# Patient Record
Sex: FEMALE | Race: BLACK OR AFRICAN AMERICAN | NOT HISPANIC OR LATINO | Employment: FULL TIME | URBAN - METROPOLITAN AREA
[De-identification: names, ages, dates, MRNs, and addresses within clinical notes are randomized per-mention and may not be internally consistent; named-entity substitution may affect disease eponyms.]

---

## 2017-10-24 ENCOUNTER — CLINICAL SUPPORT (OUTPATIENT)
Dept: FAMILY MEDICINE | Facility: CLINIC | Age: 47
End: 2017-10-24
Payer: COMMERCIAL

## 2017-10-24 VITALS — HEART RATE: 80 BPM | SYSTOLIC BLOOD PRESSURE: 140 MMHG | DIASTOLIC BLOOD PRESSURE: 90 MMHG

## 2017-10-24 PROCEDURE — 99999 PR PBB SHADOW E&M-EST. PATIENT-LVL II: CPT | Mod: PBBFAC,,,

## 2017-10-24 NOTE — PROGRESS NOTES
.Rach Davalos 47 y.o. female is here today for Blood Pressure check.     History of HTN no.    Review of patient's allergies indicates:  No Known Allergies    No results found for: CREATININE, NA, K]    Patient denies taking blood pressure medications . Currently not on any blood pressure meds    Current Outpatient Prescriptions:     acetaminophen (TYLENOL) 325 MG tablet, Take 1 tablet (325 mg total) by mouth once daily., Disp: 30 tablet, Rfl: 0    Does patient have record of home blood pressure readings no. Have not been checking blood pressures at home. States that she will buy a cuff and start blood pressure at home. Verbalizes understanding of reading labels for sodium, monitoring sodium, and exercising. She admits to eating bad for the last 2 days - had ham and fried chicken     Patient is asymptomatic.     Complains of - no complaints    BP: (!) 140/90 , Pulse: 80 .    Blood pressure reading after 15 minutes was 142/92, Pulse 76    Dr Luciano will be notified - pt has scheduled an appt with him on 11/1/17

## 2017-11-01 ENCOUNTER — OFFICE VISIT (OUTPATIENT)
Dept: FAMILY MEDICINE | Facility: CLINIC | Age: 47
End: 2017-11-01
Payer: COMMERCIAL

## 2017-11-01 VITALS
WEIGHT: 285.06 LBS | HEIGHT: 64 IN | TEMPERATURE: 98 F | RESPIRATION RATE: 16 BRPM | DIASTOLIC BLOOD PRESSURE: 90 MMHG | BODY MASS INDEX: 48.67 KG/M2 | HEART RATE: 98 BPM | OXYGEN SATURATION: 98 % | SYSTOLIC BLOOD PRESSURE: 150 MMHG

## 2017-11-01 DIAGNOSIS — E66.01 MORBIDLY OBESE: Primary | ICD-10-CM

## 2017-11-01 DIAGNOSIS — R60.0 LOWER EXTREMITY EDEMA: ICD-10-CM

## 2017-11-01 DIAGNOSIS — Z00.00 ANNUAL PHYSICAL EXAM: ICD-10-CM

## 2017-11-01 DIAGNOSIS — R07.9 CHEST PAIN, UNSPECIFIED TYPE: ICD-10-CM

## 2017-11-01 PROCEDURE — 99999 PR PBB SHADOW E&M-EST. PATIENT-LVL IV: CPT | Mod: PBBFAC,,, | Performed by: FAMILY MEDICINE

## 2017-11-01 PROCEDURE — 99386 PREV VISIT NEW AGE 40-64: CPT | Mod: S$GLB,,, | Performed by: FAMILY MEDICINE

## 2017-11-01 NOTE — PROGRESS NOTES
Chief Complaint   Patient presents with    Annual Exam     would like an overall checkup       HPI  Rach Davalos is a 47 y.o. female with multiple medical diagnoses as listed in the medical history and problem list that presents for annual exam.      Annual exam     Obese - motivated to be preventive on health    Elevated BP - HA or blurry vision. Pt currently wears glasses.  States with activity, will feel mild chest pressure, although very rare. Decreased activity overall.     Pt is known to me and was last seen by me on Visit date not found.    PAST MEDICAL HISTORY:  History reviewed. No pertinent past medical history.    PAST SURGICAL HISTORY:  Past Surgical History:   Procedure Laterality Date     SECTION      4 kids. All c-sections       SOCIAL HISTORY:  Social History     Social History    Marital status:      Spouse name: N/A    Number of children: N/A    Years of education: N/A     Occupational History    Not on file.     Social History Main Topics    Smoking status: Never Smoker    Smokeless tobacco: Not on file    Alcohol use No    Drug use: No    Sexual activity: Not on file     Other Topics Concern    Not on file     Social History Narrative    No narrative on file       FAMILY HISTORY:  Family History   Problem Relation Age of Onset    Hypertension Mother     Heart attack Father        ALLERGIES AND MEDICATIONS: updated and reviewed.  Review of patient's allergies indicates:  No Known Allergies  Current Outpatient Prescriptions   Medication Sig Dispense Refill    acetaminophen (TYLENOL) 325 MG tablet Take 1 tablet (325 mg total) by mouth once daily. 30 tablet 0     No current facility-administered medications for this visit.        ROS  Review of Systems   Constitutional: Negative for activity change, appetite change and fever.   HENT: Negative for congestion and sore throat.    Eyes: Negative for visual disturbance.   Respiratory: Positive for chest tightness. Negative  "for cough and shortness of breath.    Cardiovascular: Negative for chest pain.   Gastrointestinal: Negative for abdominal pain, diarrhea, nausea and vomiting.   Endocrine: Negative.    Genitourinary: Negative for dysuria.   Musculoskeletal: Positive for joint swelling. Negative for arthralgias and back pain.   Skin: Negative for rash.   Allergic/Immunologic: Negative.    Neurological: Negative for dizziness, weakness and headaches.   Hematological: Negative.    Psychiatric/Behavioral: Negative for agitation and confusion.       Physical Exam  Vitals:    11/01/17 1633   BP: (!) 150/90   Pulse:    Resp:    Temp:     Body mass index is 48.93 kg/m².  Weight: 129.3 kg (285 lb 0.9 oz)   Height: 5' 4" (162.6 cm)     Physical Exam   Constitutional: She is oriented to person, place, and time. She appears well-developed and well-nourished.   HENT:   Head: Normocephalic and atraumatic.   Eyes: Conjunctivae and EOM are normal. Pupils are equal, round, and reactive to light.   Neck: Normal range of motion. Neck supple.   Cardiovascular: Normal rate, regular rhythm and normal heart sounds.    Pulmonary/Chest: Effort normal and breath sounds normal.   Abdominal: Soft. Bowel sounds are normal.   Musculoskeletal: Normal range of motion.   Neurological: She is alert and oriented to person, place, and time. She has normal reflexes.   Skin: Skin is warm and dry.   Psychiatric: She has a normal mood and affect. Her behavior is normal. Judgment and thought content normal.       Health Maintenance       Date Due Completion Date    Mammogram 04/29/2011 ---    Pap Smear with HPV Cotest 05/15/2017 5/15/2014    Influenza Vaccine 08/01/2017 ---    Lipid Panel 04/26/2019 4/26/2014 (Done)    Override on 4/26/2014: Done    TETANUS VACCINE 10/25/2019 10/25/2009 (Done)    Override on 10/25/2009: Done          Assessment & Plan    Morbidly obese    Annual physical exam  -     CBC auto differential; Future; Expected date: 11/01/2017  -     " Comprehensive metabolic panel; Future; Expected date: 11/01/2017  -     Lipid panel; Future; Expected date: 11/01/2017  -     T4, free; Future; Expected date: 11/01/2017  -     TSH; Future; Expected date: 11/01/2017  -     Mammo Digital Screening Bilat with CAD; Future; Expected date: 11/01/2017  - Counseled on age appropriate medical preventative services, including age appropriate cancer screenings, over all nutritional health, need for a consistent exercise regimen and an over all push towards maintaining a vigorous and active lifestyle.      - Counseled on age appropriate vaccines and discussed upcoming health care needs based on age/gender.  Spent time with patient counseling on need for a good patient/doctor relationship moving forward.  Discussed use of common OTC medications and supplements.  Discussed common dietary aids and use of caffeine and the need for good sleep hygiene and stress management.    - Pt refuses medications at this time  - Discussed lifestyle changes      Chest pain, unspecified type  -     Exercise stress echo; Future    Lower extremity edema  -     Exercise stress echo; Future        Return in about 4 weeks (around 11/29/2017).

## 2017-11-20 ENCOUNTER — HOSPITAL ENCOUNTER (OUTPATIENT)
Dept: RADIOLOGY | Facility: HOSPITAL | Age: 47
Discharge: HOME | End: 2017-11-20
Attending: FAMILY MEDICINE
Payer: COMMERCIAL

## 2017-11-20 VITALS — BODY MASS INDEX: 48.65 KG/M2 | WEIGHT: 285 LBS | HEIGHT: 64 IN

## 2017-11-20 DIAGNOSIS — Z12.31 VISIT FOR SCREENING MAMMOGRAM: ICD-10-CM

## 2017-11-20 PROCEDURE — 77067 SCR MAMMO BI INCL CAD: CPT | Mod: TC

## 2017-11-20 PROCEDURE — 77067 SCR MAMMO BI INCL CAD: CPT | Mod: 26,,, | Performed by: RADIOLOGY

## 2017-11-20 PROCEDURE — 77063 BREAST TOMOSYNTHESIS BI: CPT | Mod: 26,,, | Performed by: RADIOLOGY

## 2017-11-22 ENCOUNTER — CLINICAL SUPPORT (OUTPATIENT)
Dept: FAMILY MEDICINE | Facility: CLINIC | Age: 47
End: 2017-11-22
Payer: COMMERCIAL

## 2017-11-22 VITALS — DIASTOLIC BLOOD PRESSURE: 86 MMHG | SYSTOLIC BLOOD PRESSURE: 138 MMHG

## 2017-11-22 DIAGNOSIS — R03.0 ELEVATED BLOOD-PRESSURE READING WITHOUT DIAGNOSIS OF HYPERTENSION: Primary | ICD-10-CM

## 2017-11-22 NOTE — PROGRESS NOTES
Rach Davalos 47 y.o. female is here today for Blood Pressure check.   History of HTN no.    Review of patient's allergies indicates:  No Known Allergies  Creatinine   Date Value Ref Range Status   11/20/2017 0.8 0.5 - 1.4 mg/dL Final     Sodium   Date Value Ref Range Status   11/20/2017 139 136 - 145 mmol/L Final     Potassium   Date Value Ref Range Status   11/20/2017 4.0 3.5 - 5.1 mmol/L Final   ]  Patient denies taking blood pressure medications on a regular basis at the same time of the day.     Current Outpatient Prescriptions:     acetaminophen (TYLENOL) 325 MG tablet, Take 1 tablet (325 mg total) by mouth once daily., Disp: 30 tablet, Rfl: 0  Does patient have record of home blood pressure readings no. Readings have been averaging not done.   Last dose of blood pressure medication was taken at not on any medications.  Patient is asymptomatic.   Complains of no complaints.    Vitals:    11/22/17 1311   BP: 138/86   BP Location: Right arm   Patient Position: Sitting   BP Method: Large (Manual)         Dr. Luciano notified.

## 2017-11-29 ENCOUNTER — HOSPITAL ENCOUNTER (INPATIENT)
Facility: HOSPITAL | Age: 47
LOS: 2 days | Discharge: HOME | DRG: 281 | End: 2017-12-01
Attending: HOSPITALIST | Admitting: HOSPITALIST
Payer: COMMERCIAL

## 2017-11-29 ENCOUNTER — NURSE TRIAGE (OUTPATIENT)
Dept: ADMINISTRATIVE | Facility: CLINIC | Age: 47
End: 2017-11-29

## 2017-11-29 ENCOUNTER — HOSPITAL ENCOUNTER (EMERGENCY)
Facility: OTHER | Age: 47
Discharge: ANOTHER HOSPITAL | End: 2017-11-29
Attending: EMERGENCY MEDICINE
Payer: COMMERCIAL

## 2017-11-29 VITALS
DIASTOLIC BLOOD PRESSURE: 75 MMHG | SYSTOLIC BLOOD PRESSURE: 123 MMHG | RESPIRATION RATE: 18 BRPM | OXYGEN SATURATION: 97 % | BODY MASS INDEX: 47.72 KG/M2 | TEMPERATURE: 99 F | WEIGHT: 278 LBS | HEART RATE: 80 BPM

## 2017-11-29 DIAGNOSIS — E66.01 MORBIDLY OBESE: ICD-10-CM

## 2017-11-29 DIAGNOSIS — E88.810 METABOLIC SYNDROME: ICD-10-CM

## 2017-11-29 DIAGNOSIS — I21.4 NSTEMI (NON-ST ELEVATED MYOCARDIAL INFARCTION): Primary | ICD-10-CM

## 2017-11-29 DIAGNOSIS — R07.9 CHEST PAIN: ICD-10-CM

## 2017-11-29 LAB
B-HCG UR QL: NEGATIVE
BILIRUBIN, POC UA: NEGATIVE
BLOOD, POC UA: NEGATIVE
CLARITY, POC UA: CLEAR
COLOR, POC UA: YELLOW
CTP QC/QA: YES
GLUCOSE, POC UA: NEGATIVE
KETONES, POC UA: NEGATIVE
LEUKOCYTE EST, POC UA: NEGATIVE
NITRITE, POC UA: NEGATIVE
PH UR STRIP: 7.5 [PH]
POC B-TYPE NATRIURETIC PEPTIDE: 13.8 PG/ML (ref 0–100)
POC CARDIAC TROPONIN I: 0.22 NG/ML
POC PTINR: 1.1 (ref 0.9–1.2)
POC PTWBT: 13.3 SEC (ref 9.7–14.3)
PROTEIN, POC UA: NEGATIVE
SAMPLE: ABNORMAL
SAMPLE: NORMAL
SPECIFIC GRAVITY, POC UA: 1.02
UROBILINOGEN, POC UA: 0.2 E.U./DL

## 2017-11-29 PROCEDURE — 85651 RBC SED RATE NONAUTOMATED: CPT

## 2017-11-29 PROCEDURE — 96374 THER/PROPH/DIAG INJ IV PUSH: CPT

## 2017-11-29 PROCEDURE — 25000003 PHARM REV CODE 250: Performed by: HOSPITALIST

## 2017-11-29 PROCEDURE — 85025 COMPLETE CBC W/AUTO DIFF WBC: CPT

## 2017-11-29 PROCEDURE — 63600175 PHARM REV CODE 636 W HCPCS: Performed by: EMERGENCY MEDICINE

## 2017-11-29 PROCEDURE — 12000002 HC ACUTE/MED SURGE SEMI-PRIVATE ROOM

## 2017-11-29 PROCEDURE — 96372 THER/PROPH/DIAG INJ SC/IM: CPT

## 2017-11-29 PROCEDURE — 81003 URINALYSIS AUTO W/O SCOPE: CPT

## 2017-11-29 PROCEDURE — 25000003 PHARM REV CODE 250: Performed by: EMERGENCY MEDICINE

## 2017-11-29 PROCEDURE — 84484 ASSAY OF TROPONIN QUANT: CPT

## 2017-11-29 PROCEDURE — 86141 C-REACTIVE PROTEIN HS: CPT

## 2017-11-29 PROCEDURE — 80053 COMPREHEN METABOLIC PANEL: CPT

## 2017-11-29 PROCEDURE — 96375 TX/PRO/DX INJ NEW DRUG ADDON: CPT

## 2017-11-29 PROCEDURE — 99285 EMERGENCY DEPT VISIT HI MDM: CPT

## 2017-11-29 PROCEDURE — 81025 URINE PREGNANCY TEST: CPT | Performed by: EMERGENCY MEDICINE

## 2017-11-29 PROCEDURE — 84100 ASSAY OF PHOSPHORUS: CPT

## 2017-11-29 PROCEDURE — 83880 ASSAY OF NATRIURETIC PEPTIDE: CPT

## 2017-11-29 PROCEDURE — 36415 COLL VENOUS BLD VENIPUNCTURE: CPT

## 2017-11-29 PROCEDURE — 85610 PROTHROMBIN TIME: CPT

## 2017-11-29 RX ORDER — BISACODYL 10 MG
10 SUPPOSITORY, RECTAL RECTAL DAILY PRN
Status: DISCONTINUED | OUTPATIENT
Start: 2017-11-30 | End: 2017-12-01 | Stop reason: HOSPADM

## 2017-11-29 RX ORDER — PROMETHAZINE HYDROCHLORIDE 25 MG/1
25 SUPPOSITORY RECTAL EVERY 6 HOURS PRN
Status: DISCONTINUED | OUTPATIENT
Start: 2017-11-30 | End: 2017-12-01 | Stop reason: HOSPADM

## 2017-11-29 RX ORDER — NITROGLYCERIN 0.4 MG/1
0.4 TABLET SUBLINGUAL EVERY 5 MIN PRN
Status: DISCONTINUED | OUTPATIENT
Start: 2017-11-30 | End: 2017-12-01 | Stop reason: HOSPADM

## 2017-11-29 RX ORDER — MORPHINE SULFATE 5 MG/ML
4 INJECTION, SOLUTION INTRAMUSCULAR; INTRAVENOUS EVERY 4 HOURS PRN
Status: DISCONTINUED | OUTPATIENT
Start: 2017-11-30 | End: 2017-12-01 | Stop reason: HOSPADM

## 2017-11-29 RX ORDER — ASPIRIN 325 MG
325 TABLET ORAL DAILY
Status: DISCONTINUED | OUTPATIENT
Start: 2017-11-30 | End: 2017-11-30

## 2017-11-29 RX ORDER — MORPHINE SULFATE 5 MG/ML
2 INJECTION, SOLUTION INTRAMUSCULAR; INTRAVENOUS EVERY 4 HOURS PRN
Status: DISCONTINUED | OUTPATIENT
Start: 2017-11-30 | End: 2017-12-01 | Stop reason: HOSPADM

## 2017-11-29 RX ORDER — SODIUM CHLORIDE 0.9 % (FLUSH) 0.9 %
3 SYRINGE (ML) INJECTION EVERY 8 HOURS
Status: DISCONTINUED | OUTPATIENT
Start: 2017-11-30 | End: 2017-12-01 | Stop reason: HOSPADM

## 2017-11-29 RX ORDER — NITROGLYCERIN 0.4 MG/1
0.4 TABLET SUBLINGUAL EVERY 5 MIN PRN
Status: DISCONTINUED | OUTPATIENT
Start: 2017-11-29 | End: 2017-11-29 | Stop reason: HOSPADM

## 2017-11-29 RX ORDER — AMOXICILLIN 250 MG
1 CAPSULE ORAL 2 TIMES DAILY
Status: DISCONTINUED | OUTPATIENT
Start: 2017-11-29 | End: 2017-12-01 | Stop reason: HOSPADM

## 2017-11-29 RX ORDER — HYDRALAZINE HYDROCHLORIDE 20 MG/ML
10 INJECTION INTRAMUSCULAR; INTRAVENOUS EVERY 6 HOURS PRN
Status: DISCONTINUED | OUTPATIENT
Start: 2017-11-30 | End: 2017-12-01 | Stop reason: HOSPADM

## 2017-11-29 RX ORDER — ACETAMINOPHEN 325 MG/1
650 TABLET ORAL EVERY 6 HOURS PRN
Status: DISCONTINUED | OUTPATIENT
Start: 2017-11-30 | End: 2017-12-01 | Stop reason: HOSPADM

## 2017-11-29 RX ORDER — RAMELTEON 8 MG/1
8 TABLET ORAL NIGHTLY PRN
Status: DISCONTINUED | OUTPATIENT
Start: 2017-11-30 | End: 2017-12-01 | Stop reason: HOSPADM

## 2017-11-29 RX ORDER — DEXTROMETHORPHAN POLISTIREX 30 MG/5 ML
1 SUSPENSION, EXTENDED RELEASE 12 HR ORAL DAILY PRN
Status: DISCONTINUED | OUTPATIENT
Start: 2017-11-30 | End: 2017-12-01 | Stop reason: HOSPADM

## 2017-11-29 RX ORDER — POLYETHYLENE GLYCOL 3350 17 G/17G
17 POWDER, FOR SOLUTION ORAL DAILY
Status: DISCONTINUED | OUTPATIENT
Start: 2017-11-30 | End: 2017-12-01 | Stop reason: HOSPADM

## 2017-11-29 RX ORDER — ONDANSETRON 2 MG/ML
8 INJECTION INTRAMUSCULAR; INTRAVENOUS EVERY 8 HOURS PRN
Status: DISCONTINUED | OUTPATIENT
Start: 2017-11-30 | End: 2017-12-01 | Stop reason: HOSPADM

## 2017-11-29 RX ORDER — ENOXAPARIN SODIUM 100 MG/ML
1 INJECTION SUBCUTANEOUS
Status: COMPLETED | OUTPATIENT
Start: 2017-11-29 | End: 2017-11-29

## 2017-11-29 RX ORDER — MORPHINE SULFATE 10 MG/ML
4 INJECTION INTRAMUSCULAR; INTRAVENOUS; SUBCUTANEOUS
Status: COMPLETED | OUTPATIENT
Start: 2017-11-29 | End: 2017-11-29

## 2017-11-29 RX ORDER — ENOXAPARIN SODIUM 100 MG/ML
40 INJECTION SUBCUTANEOUS EVERY 24 HOURS
Status: DISCONTINUED | OUTPATIENT
Start: 2017-11-30 | End: 2017-11-30 | Stop reason: DRUGHIGH

## 2017-11-29 RX ORDER — ATORVASTATIN CALCIUM 40 MG/1
80 TABLET, FILM COATED ORAL DAILY
Status: DISCONTINUED | OUTPATIENT
Start: 2017-11-30 | End: 2017-12-01 | Stop reason: HOSPADM

## 2017-11-29 RX ORDER — ASPIRIN 325 MG
325 TABLET ORAL
Status: COMPLETED | OUTPATIENT
Start: 2017-11-29 | End: 2017-11-29

## 2017-11-29 RX ORDER — METOPROLOL TARTRATE 50 MG/1
50 TABLET ORAL 2 TIMES DAILY
Status: DISCONTINUED | OUTPATIENT
Start: 2017-11-29 | End: 2017-12-01 | Stop reason: HOSPADM

## 2017-11-29 RX ORDER — METOPROLOL TARTRATE 1 MG/ML
5 INJECTION, SOLUTION INTRAVENOUS EVERY 5 MIN PRN
Status: DISCONTINUED | OUTPATIENT
Start: 2017-11-30 | End: 2017-12-01 | Stop reason: HOSPADM

## 2017-11-29 RX ORDER — ONDANSETRON 2 MG/ML
4 INJECTION INTRAMUSCULAR; INTRAVENOUS
Status: COMPLETED | OUTPATIENT
Start: 2017-11-29 | End: 2017-11-29

## 2017-11-29 RX ADMIN — ASPIRIN 325 MG ORAL TABLET 325 MG: 325 PILL ORAL at 05:11

## 2017-11-29 RX ADMIN — ENOXAPARIN SODIUM 100 MG: 100 INJECTION SUBCUTANEOUS at 05:11

## 2017-11-29 RX ADMIN — NITROGLYCERIN 1 INCH: 20 OINTMENT TOPICAL at 06:11

## 2017-11-29 RX ADMIN — ONDANSETRON 4 MG: 2 INJECTION INTRAMUSCULAR; INTRAVENOUS at 06:11

## 2017-11-29 RX ADMIN — NITROGLYCERIN 0.4 MG: 0.4 TABLET SUBLINGUAL at 05:11

## 2017-11-29 RX ADMIN — METOPROLOL TARTRATE 50 MG: 50 TABLET ORAL at 11:11

## 2017-11-29 RX ADMIN — MORPHINE SULFATE 4 MG: 10 INJECTION INTRAVENOUS at 06:11

## 2017-11-29 NOTE — TELEPHONE ENCOUNTER
Pt reports chest pain since this morning. Rates pain 7.5/10. Pain goes into her arm. Pain is present now.    Pt refusing ER- wants to see PCP- please contact her to advise    Reason for Disposition   Pain also present in shoulder(s) or arm(s) or jaw    Protocols used: ST CHEST PAIN-A-[place]

## 2017-11-29 NOTE — TELEPHONE ENCOUNTER
Reason for Disposition   Nursing judgment     Patient is advised as per triage earlier. Patient wants doctors appointment.No appointment with patient PCP  Is available.  Patient requesting info to . Information given.    Protocols used: ST NO PROTOCOL CALL: SICK ADULT-A-[place]

## 2017-11-29 NOTE — ED PROVIDER NOTES
Encounter Date: 2017       History     Chief Complaint   Patient presents with    Chest Pain     patient reports having chest pains/tightness which started earlier today.  patient denies radiation     Rach Davalos is a 47 y.o. female who presents to the Emergency Department with  chest pain started at 8 AM today patient reports it as a tightness feels heavy.  Patient states she was in a pre-observation meeting felt she might of been a little stressed was hoping the pain was the end after the meeting it did not.  Patient reports a heaviness got worse around 11 if today.  She tried to belch it didn't help she ate lunch at 11:30 did not improve her pain.  Patient states at the worst the pain was a 9 out of 10 on arrival to the ED the pain as a 7 out of 10.      The history is provided by the patient.   Chest Pain   The current episode started today. Chest pain occurs constantly. The chest pain is improving. At its most intense, the chest pain is at 9/10. The chest pain is currently at 7/10. The quality of the pain is described as aching, heavy, pressure-like and tightness. The pain does not radiate. Pertinent negatives for primary symptoms include no fever, no shortness of breath, no wheezing, no palpitations and no nausea.   Pertinent negatives for associated symptoms include no weakness. She tried nothing for the symptoms. There are no known risk factors.   Pertinent negatives for past medical history include no CAD and no diabetes.   Her family medical history is significant for CAD.     Review of patient's allergies indicates:  No Known Allergies  No past medical history on file.  Past Surgical History:   Procedure Laterality Date     SECTION      4 kids. All c-sections     Family History   Problem Relation Age of Onset    Hypertension Mother     Heart attack Father      Social History   Substance Use Topics    Smoking status: Never Smoker    Smokeless tobacco: Never Used    Alcohol use No      Review of Systems   Constitutional: Negative for fever.   HENT: Negative for sore throat.    Respiratory: Negative for shortness of breath and wheezing.    Cardiovascular: Positive for chest pain. Negative for palpitations.   Gastrointestinal: Negative for nausea.   Genitourinary: Negative for dysuria.   Musculoskeletal: Negative for back pain.   Skin: Negative for rash.   Neurological: Negative for weakness.   Hematological: Does not bruise/bleed easily.   All other systems reviewed and are negative.      Physical Exam     Initial Vitals   BP Pulse Resp Temp SpO2   11/29/17 1618 11/29/17 1608 11/29/17 1608 11/29/17 1608 11/29/17 1608   (!) 180/98 80 18 97.8 °F (36.6 °C) 100 %      MAP       11/29/17 1618       125.33         Physical Exam    Nursing note and vitals reviewed.  Constitutional: She appears well-developed and well-nourished. She is Obese . She appears distressed.   HENT:   Head: Normocephalic and atraumatic.   Right Ear: External ear normal.   Left Ear: External ear normal.   Eyes: Conjunctivae and EOM are normal. Pupils are equal, round, and reactive to light. Right eye exhibits no discharge. Left eye exhibits no discharge.   Neck: Normal range of motion. Neck supple. JVD present.   Cardiovascular: Normal rate, regular rhythm, normal heart sounds and intact distal pulses. Exam reveals no gallop and no friction rub.    No murmur heard.  Pulmonary/Chest: Breath sounds normal. No respiratory distress. She has no wheezes. She has no rhonchi. She has no rales. She exhibits no tenderness.   Abdominal: Soft. Bowel sounds are normal. She exhibits no distension and no mass. There is no tenderness. There is no rebound and no guarding.   Musculoskeletal: Normal range of motion. She exhibits no edema or tenderness.   Lymphadenopathy:     She has no cervical adenopathy.   Neurological: She is alert and oriented to person, place, and time. She has normal strength and normal reflexes. She displays normal  reflexes. No cranial nerve deficit or sensory deficit.   Skin: Skin is warm and dry. Capillary refill takes less than 2 seconds. No rash noted. No pallor.   Psychiatric: She has a normal mood and affect.         ED Course   Critical Care  Date/Time: 11/29/2017 7:23 PM  Performed by: MEDINA RYAN  Authorized by: MEDINA RYAN   Direct patient critical care time: 15 minutes  Additional history critical care time: 10 minutes  Ordering / reviewing critical care time: 15 minutes  Documentation critical care time: 12 minutes  Consulting other physicians critical care time: 10 minutes  Consult with family critical care time: 10 minutes  Total critical care time (exclusive of procedural time) : 72 minutes  Critical care was necessary to treat or prevent imminent or life-threatening deterioration of the following conditions: cardiac failure.  Critical care was time spent personally by me on the following activities: discussions with consultants, interpretation of cardiac output measurements, evaluation of patient's response to treatment, examination of patient, obtaining history from patient or surrogate, ordering and performing treatments and interventions, ordering and review of laboratory studies, ordering and review of radiographic studies, pulse oximetry, re-evaluation of patient's condition and review of old charts.  Comments: Patient given aspirin on arrival and placed on oxygen.  EKG no ST elevation appreciated.  Patient given nitroglycerin with significant relief of pain.  After nitroglycerin pain was 3/10 from 9/10.  Patient given morphine 4 mg IV with complete relief of all pain.  Nitroglycerin placed pain*chest wall.  Patient admitted to [facility] for NSTEMI        Labs Reviewed   TROPONIN ISTAT - Abnormal; Notable for the following:        Result Value    POC Cardiac Troponin I 0.22 (*)     All other components within normal limits   POCT URINALYSIS W/O SCOPE - Abnormal; Notable for the following:      Glucose, UA Negative (*)     Bilirubin, UA Negative (*)     Ketones, UA Negative (*)     Blood, UA Negative (*)     Protein, UA Negative (*)     Nitrite, UA Negative (*)     Leukocytes, UA Negative (*)     All other components within normal limits   POCT URINALYSIS W/O SCOPE   POCT URINE PREGNANCY   POCT CBC   POCT CMP   POCT PROTIME-INR   POCT TROPONIN   POCT B-TYPE NATRIURETIC PEPTIDE (BNP)   ISTAT PROCEDURE   POCT B-TYPE NATRIURETIC PEPTIDE (BNP)       Troponin is elevated at 0.22.    BNP is 13.8  CBC White blood cell count 8.5, hemoglobin 12.5, hematocrit 39.6, and platelet is 246  CMP sodium 143, potassium 5.3, bicarbonate 27, chloride 104, glucose 96, be UN 11, and creatinine 0.7  INR is 1.1  UA negative leukocytes, negative nitrates, negative ketones, negative glucose    EKG Readings: (Independently Interpreted)   Initial Reading: No STEMI. Rhythm: Normal Sinus Rhythm. Heart Rate: 78. Axis: Normal. Other Impression: Normal sinus rhythm, no ST elevation, LVH by criteria, QTC normal at 440.   Other EKG Interpretations: Repeat EKG done at 1715  Ventricular rate of 91, normal sinus rhythm  On repeat EKG half a box of ST elevation in lead 1 and V2.  No ST depression reciprocal changes appreciated.  LVH via criteria.     Imaging Results          X-Ray Chest PA And Lateral (Final result)  Result time 11/29/17 17:27:47    Final result by Los Arce MD (11/29/17 17:27:47)                 Impression:        No radiographic acute intrathoracic process seen.      Electronically signed by: LOS ARCE MD, MD  Date:     11/29/17  Time:    17:27              Narrative:    COMPARISON: None    FINDINGS: PA and lateral views of the chest. Large body habitus.   Pulmonary vasculature and hilar regions are within normal limits.  The bilateral lungs are well expanded and clear.  No pleural effusion or pneumothorax.  The heart and mediastinal contours are within normal limits for age.  Included osseous structures appear  intact.                                                   ED Course          Medical decision making   Chief complaint: Chest pain that started at 8:30 AM  Differential diagnosis: STEMI, in STEMI, pneumothorax, bronchitis, pneumonia, and anxiety  Treatment in the ED Physical Exam, aspirin, nitroglycerin, oxygen, and morphine  Patient reports significantly decreased pain after medications given in the ED.  Patient states all pressure and tightness in her chest has resolved.    Discussed labs, and imaging results.    Discussed with patient and her family need for admission.  Both are agreeable to admission and transfer to [facility].  Contacted [facility] and  spoke with Debby at 17:15. Consultation with DR Fowler for admission.  Discussed patient's presentation, past medical history, physical exam, and ED course.  Also discussed vital signs and diagnosis is.  At this time patient will be transferred & admitted.     At time of transfer patient was awake alert resting comfortably in no distress moving all 4 extremities.  Clinical Impression:   The encounter diagnosis was NSTEMI (non-ST elevated myocardial infarction).                           Lexi Meza, DO  11/29/17 1845       Lexi Meza, DO  11/29/17 1922

## 2017-11-30 LAB
ALBUMIN SERPL BCP-MCNC: 2.9 G/DL
ALBUMIN SERPL-MCNC: 3.3 G/DL (ref 3.3–5.5)
ALP SERPL-CCNC: 57 U/L (ref 42–141)
ALP SERPL-CCNC: 66 U/L
ALT SERPL W/O P-5'-P-CCNC: 19 U/L
ANION GAP SERPL CALC-SCNC: 5 MMOL/L
AST SERPL-CCNC: 35 U/L
BILIRUB SERPL-MCNC: 0.4 MG/DL
BILIRUB SERPL-MCNC: 0.4 MG/DL (ref 0.2–1.6)
BUN SERPL-MCNC: 11 MG/DL (ref 7–22)
BUN SERPL-MCNC: 9 MG/DL
CALCIUM SERPL-MCNC: 9 MG/DL
CALCIUM SERPL-MCNC: 9.2 MG/DL (ref 8–10.3)
CHLORIDE SERPL-SCNC: 104 MMOL/L
CHLORIDE SERPL-SCNC: 104 MMOL/L (ref 98–108)
CHOLEST SERPL-MCNC: 210 MG/DL
CHOLEST/HDLC SERPL: 3.5 {RATIO}
CO2 SERPL-SCNC: 27 MMOL/L
CORONARY STENOSIS: ABNORMAL
CREAT SERPL-MCNC: 0.7 MG/DL (ref 0.6–1.2)
CREAT SERPL-MCNC: 0.8 MG/DL
CRP SERPL-MCNC: 14.23 MG/L
ERYTHROCYTE [SEDIMENTATION RATE] IN BLOOD BY WESTERGREN METHOD: 45 MM/HR
EST. GFR  (AFRICAN AMERICAN): >60 ML/MIN/1.73 M^2
EST. GFR  (NON AFRICAN AMERICAN): >60 ML/MIN/1.73 M^2
ESTIMATED PA SYSTOLIC PRESSURE: 31.94
GLOBAL PERICARDIAL EFFUSION: NORMAL
GLUCOSE SERPL-MCNC: 127 MG/DL
GLUCOSE SERPL-MCNC: 96 MG/DL (ref 73–118)
HDLC SERPL-MCNC: 60 MG/DL
HDLC SERPL: 28.6 %
LDLC SERPL CALC-MCNC: 134.4 MG/DL
MAGNESIUM SERPL-MCNC: 2 MG/DL
MITRAL VALVE MOBILITY: NORMAL
MITRAL VALVE REGURGITATION: NORMAL
NONHDLC SERPL-MCNC: 150 MG/DL
PHOSPHATE SERPL-MCNC: 3.1 MG/DL
POC ALT (SGPT): 22 U/L (ref 10–47)
POC AST (SGOT): 34 U/L (ref 11–38)
POC TCO2: 27 MMOL/L (ref 18–33)
POTASSIUM BLD-SCNC: 5.3 MMOL/L (ref 3.6–5.1)
POTASSIUM SERPL-SCNC: 4.5 MMOL/L
PROT SERPL-MCNC: 7.6 G/DL
PROTEIN, POC: 7.8 G/DL (ref 6.4–8.1)
RETIRED EF AND QEF - SEE NOTES: 55 (ref 55–65)
SODIUM BLD-SCNC: 143 MMOL/L (ref 128–145)
SODIUM SERPL-SCNC: 136 MMOL/L
TRICUSPID VALVE REGURGITATION: NORMAL
TRIGL SERPL-MCNC: 78 MG/DL
TROPONIN I SERPL DL<=0.01 NG/ML-MCNC: 2.01 NG/ML
TROPONIN I SERPL DL<=0.01 NG/ML-MCNC: 3.6 NG/ML
TROPONIN I SERPL DL<=0.01 NG/ML-MCNC: 3.61 NG/ML

## 2017-11-30 PROCEDURE — 25000003 PHARM REV CODE 250

## 2017-11-30 PROCEDURE — 80061 LIPID PANEL: CPT

## 2017-11-30 PROCEDURE — 93306 TTE W/DOPPLER COMPLETE: CPT | Mod: 26,,, | Performed by: INTERNAL MEDICINE

## 2017-11-30 PROCEDURE — 93458 L HRT ARTERY/VENTRICLE ANGIO: CPT | Mod: 26,,, | Performed by: INTERNAL MEDICINE

## 2017-11-30 PROCEDURE — 36415 COLL VENOUS BLD VENIPUNCTURE: CPT

## 2017-11-30 PROCEDURE — C1769 GUIDE WIRE: HCPCS

## 2017-11-30 PROCEDURE — 94761 N-INVAS EAR/PLS OXIMETRY MLT: CPT

## 2017-11-30 PROCEDURE — A4216 STERILE WATER/SALINE, 10 ML: HCPCS

## 2017-11-30 PROCEDURE — 25000003 PHARM REV CODE 250: Performed by: HOSPITALIST

## 2017-11-30 PROCEDURE — A4216 STERILE WATER/SALINE, 10 ML: HCPCS | Performed by: HOSPITALIST

## 2017-11-30 PROCEDURE — 80053 COMPREHEN METABOLIC PANEL: CPT

## 2017-11-30 PROCEDURE — 25000003 PHARM REV CODE 250: Performed by: INTERNAL MEDICINE

## 2017-11-30 PROCEDURE — 93306 TTE W/DOPPLER COMPLETE: CPT

## 2017-11-30 PROCEDURE — 83735 ASSAY OF MAGNESIUM: CPT

## 2017-11-30 PROCEDURE — 27000221 HC OXYGEN, UP TO 24 HOURS

## 2017-11-30 PROCEDURE — 4A023N7 MEASUREMENT OF CARDIAC SAMPLING AND PRESSURE, LEFT HEART, PERCUTANEOUS APPROACH: ICD-10-PCS | Performed by: INTERNAL MEDICINE

## 2017-11-30 PROCEDURE — 99152 MOD SED SAME PHYS/QHP 5/>YRS: CPT

## 2017-11-30 PROCEDURE — 12000002 HC ACUTE/MED SURGE SEMI-PRIVATE ROOM

## 2017-11-30 PROCEDURE — 99255 IP/OBS CONSLTJ NEW/EST HI 80: CPT | Mod: ,,, | Performed by: INTERNAL MEDICINE

## 2017-11-30 PROCEDURE — 84484 ASSAY OF TROPONIN QUANT: CPT | Mod: 91

## 2017-11-30 PROCEDURE — B2111ZZ FLUOROSCOPY OF MULTIPLE CORONARY ARTERIES USING LOW OSMOLAR CONTRAST: ICD-10-PCS | Performed by: INTERNAL MEDICINE

## 2017-11-30 PROCEDURE — 63600175 PHARM REV CODE 636 W HCPCS

## 2017-11-30 PROCEDURE — 99152 MOD SED SAME PHYS/QHP 5/>YRS: CPT | Mod: ,,, | Performed by: INTERNAL MEDICINE

## 2017-11-30 RX ORDER — ASPIRIN 81 MG/1
81 TABLET ORAL DAILY
Status: DISCONTINUED | OUTPATIENT
Start: 2017-11-30 | End: 2017-12-01 | Stop reason: HOSPADM

## 2017-11-30 RX ORDER — ENOXAPARIN SODIUM 100 MG/ML
1 INJECTION SUBCUTANEOUS
Status: DISCONTINUED | OUTPATIENT
Start: 2017-11-30 | End: 2017-11-30

## 2017-11-30 RX ORDER — HYDROCODONE BITARTRATE AND ACETAMINOPHEN 5; 325 MG/1; MG/1
1 TABLET ORAL EVERY 4 HOURS PRN
Status: DISCONTINUED | OUTPATIENT
Start: 2017-11-30 | End: 2017-12-01 | Stop reason: HOSPADM

## 2017-11-30 RX ORDER — SODIUM CHLORIDE 9 MG/ML
5 INJECTION, SOLUTION INTRAVENOUS CONTINUOUS
Status: ACTIVE | OUTPATIENT
Start: 2017-11-30 | End: 2017-11-30

## 2017-11-30 RX ORDER — CLOPIDOGREL BISULFATE 75 MG/1
75 TABLET ORAL DAILY
Status: DISCONTINUED | OUTPATIENT
Start: 2017-12-01 | End: 2017-12-01 | Stop reason: HOSPADM

## 2017-11-30 RX ORDER — ENOXAPARIN SODIUM 100 MG/ML
80 INJECTION SUBCUTANEOUS
Status: DISCONTINUED | OUTPATIENT
Start: 2017-11-30 | End: 2017-11-30 | Stop reason: DRUGHIGH

## 2017-11-30 RX ORDER — CLOPIDOGREL 300 MG/1
600 TABLET, FILM COATED ORAL ONCE
Status: COMPLETED | OUTPATIENT
Start: 2017-11-30 | End: 2017-11-30

## 2017-11-30 RX ORDER — ATROPINE SULFATE 0.1 MG/ML
0.5 INJECTION INTRAVENOUS
Status: DISCONTINUED | OUTPATIENT
Start: 2017-11-30 | End: 2017-12-01 | Stop reason: HOSPADM

## 2017-11-30 RX ORDER — SODIUM CHLORIDE 9 MG/ML
INJECTION, SOLUTION INTRAVENOUS CONTINUOUS
Status: ACTIVE | OUTPATIENT
Start: 2017-11-30 | End: 2017-11-30

## 2017-11-30 RX ADMIN — SODIUM CHLORIDE: 0.9 INJECTION, SOLUTION INTRAVENOUS at 11:11

## 2017-11-30 RX ADMIN — METOPROLOL TARTRATE 50 MG: 50 TABLET ORAL at 09:11

## 2017-11-30 RX ADMIN — METOPROLOL TARTRATE 50 MG: 50 TABLET ORAL at 11:11

## 2017-11-30 RX ADMIN — ASPIRIN 81 MG: 81 TABLET, COATED ORAL at 11:11

## 2017-11-30 RX ADMIN — CLOPIDOGREL BISULFATE 600 MG: 300 TABLET, FILM COATED ORAL at 11:11

## 2017-11-30 RX ADMIN — ATORVASTATIN CALCIUM 80 MG: 40 TABLET, FILM COATED ORAL at 11:11

## 2017-11-30 RX ADMIN — SODIUM CHLORIDE 5 ML/KG/HR: 0.9 INJECTION, SOLUTION INTRAVENOUS at 04:11

## 2017-11-30 RX ADMIN — SODIUM CHLORIDE, PRESERVATIVE FREE 3 ML: 5 INJECTION INTRAVENOUS at 10:11

## 2017-11-30 RX ADMIN — SODIUM CHLORIDE, PRESERVATIVE FREE 3 ML: 5 INJECTION INTRAVENOUS at 05:11

## 2017-11-30 RX ADMIN — DOCUSATE SODIUM AND SENNOSIDES 1 TABLET: 8.6; 5 TABLET, FILM COATED ORAL at 09:11

## 2017-11-30 NOTE — SUBJECTIVE & OBJECTIVE
Interval History: no acute events    Review of Systems   Constitutional: Negative for chills and fever.   HENT: Negative for congestion and rhinorrhea.    Eyes: Negative for photophobia and visual disturbance.   Respiratory: Negative for cough and shortness of breath.    Cardiovascular: Positive for chest pain. Negative for leg swelling.   Gastrointestinal: Negative for abdominal pain, nausea and vomiting.   Genitourinary: Negative for difficulty urinating and dysuria.   Musculoskeletal: Negative for joint swelling and neck stiffness.   Skin: Negative for rash and wound.   Neurological: Negative for dizziness and light-headedness.   Psychiatric/Behavioral: Negative for agitation and behavioral problems.     Objective:     Vital Signs (Most Recent):  Temp: 98.1 °F (36.7 °C) (11/30/17 1528)  Pulse: 86 (11/30/17 1658)  Resp: 18 (11/30/17 1528)  BP: (!) 155/87 (11/30/17 1658)  SpO2: 96 % (11/30/17 1658) Vital Signs (24h Range):  Temp:  [96 °F (35.6 °C)-98.5 °F (36.9 °C)] 98.1 °F (36.7 °C)  Pulse:  [69-87] 86  Resp:  [18-20] 18  SpO2:  [93 %-100 %] 96 %  BP: (120-165)/(68-99) 155/87     Weight: 129.7 kg (285 lb 15 oz)  Body mass index is 44.78 kg/m².    Intake/Output Summary (Last 24 hours) at 11/30/17 1731  Last data filed at 11/29/17 2130   Gross per 24 hour   Intake              120 ml   Output                0 ml   Net              120 ml      Physical Exam   Constitutional: She is oriented to person, place, and time. She appears well-developed and well-nourished. No distress.   HENT:   Right Ear: External ear normal.   Left Ear: External ear normal.   Nose: Nose normal.   Eyes: EOM are normal. Pupils are equal, round, and reactive to light. No scleral icterus.   Neck: Normal range of motion. Neck supple. No thyromegaly present.   Cardiovascular: Normal rate and normal heart sounds.  Exam reveals no friction rub.    No murmur heard.  Pulmonary/Chest: Effort normal and breath sounds normal. No respiratory distress.  She has no wheezes. She has no rales.   Abdominal: Soft. Bowel sounds are normal. She exhibits no mass. There is no tenderness.   Morbidly obese   Musculoskeletal: Normal range of motion. She exhibits no edema or deformity.   Neurological: She is alert and oriented to person, place, and time. No cranial nerve deficit.   Skin: Skin is warm and dry. No pallor.   Psychiatric: She has a normal mood and affect. Her behavior is normal. Thought content normal.       Significant Labs: All pertinent labs within the past 24 hours have been reviewed.    Significant Imaging: I have reviewed and interpreted all pertinent imaging results/findings within the past 24 hours.

## 2017-11-30 NOTE — PLAN OF CARE
Discharge Planning Assessment completed.  Patient from home with spouse and independent.  No DME.  Prefers a.m. appts. and preferred pharmacy is [facility] [address]       11/30/17 1201   Discharge Assessment   Assessment Type Discharge Planning Assessment   Confirmed/corrected address and phone number on facesheet? Yes   Assessment information obtained from? Patient   Communicated expected length of stay with patient/caregiver yes   Prior to hospitilization cognitive status: Alert/Oriented   Prior to hospitalization functional status: Independent   Current cognitive status: Alert/Oriented   Current Functional Status: Independent   Facility Arrived From: Home   Lives With spouse   Able to Return to Prior Arrangements yes   Is patient able to care for self after discharge? Yes   Who are your caregiver(s) and their phone number(s)? Independent.  Spouse will be able to assist if needed.  Vince Davalos:  [phone]   Patient's perception of discharge disposition home or selfcare   Readmission Within The Last 30 Days no previous admission in last 30 days   Patient currently being followed by outpatient case management? No   Patient currently receives any other outside agency services? No   Equipment Currently Used at Home none   Do you have any problems affording any of your prescribed medications? No   Is the patient taking medications as prescribed? yes   Does the patient have transportation home? Yes   Transportation Available family or friend will provide   Does the patient receive services at the [facility]? No   Discharge Plan A Home   Discharge Plan B Home   Patient/Family In Agreement With Plan yes   Fani Jordan LMSW, LETTY-[place], CCM  11/30/2017

## 2017-11-30 NOTE — CONSULTS
[facility]  Cardiology  Consult Note    Patient Name: Rach Davalos  MRN: [id number]  Admission Date: 2017  Hospital Length of Stay: 1 days  Code Status: Full Code   Attending Provider: My Pimentel MD   Consulting Provider: Bandar Broderick MD  Primary Care Physician: Hang Luciano MD  Principal Problem:NSTEMI (non-ST elevated myocardial infarction)    Patient information was obtained from patient and ER records.     Inpatient consult to Cardiology  Consult performed by: BANDAR BRODERICK  Consult ordered by: BANDAR REEVES  Reason for consult: NSTEMI        Subjective:     Chief Complaint:  CP     HPI:   47 y.o. female who presents to the Emergency Department with  chest pain started at 8 AM today patient reports it as a tightness feels heavy.  Patient states she was in a pre-observation meeting felt she might of been a little stressed was hoping the pain was the end after the meeting it did not.  Patient reports a heaviness got worse around 11 if today.  She tried to belch it didn't help she ate lunch at 11:30 did not improve her pain.  Patient states at the worst the pain was a 9 out of 10 on arrival to the ED the pain as a 7 out of 10.    Above hx confirmed.  Pt currently pain free.  Echo normal.    Risks, benefits and alternatives of the catheterization procedure were discussed with the patient and family at [facility], which include but are not limited to: bleeding, infection, death, heart attack, arrhythmia, kidney failure, stroke, need for emergency surgery, etc.  Patient understands and and agrees to proceed.  Consent was placed on the chart.      History reviewed. No pertinent past medical history.    Past Surgical History:   Procedure Laterality Date     SECTION      4 kids. All c-sections       Review of patient's allergies indicates:  No Known Allergies    Current Facility-Administered Medications on File Prior to Encounter   Medication    [COMPLETED]  aspirin tablet 325 mg    [COMPLETED] enoxaparin injection 130 mg    [COMPLETED] morphine injection 4 mg    [COMPLETED] nitroGLYCERIN 2% TD oint ointment 1 inch    [COMPLETED] ondansetron injection 4 mg    [DISCONTINUED] nitroGLYCERIN SL tablet 0.4 mg     Current Outpatient Prescriptions on File Prior to Encounter   Medication Sig    acetaminophen (TYLENOL) 325 MG tablet Take 1 tablet (325 mg total) by mouth once daily.     Family History     Problem Relation (Age of Onset)    Heart attack Father    Hypertension Mother        Social History Main Topics    Smoking status: Never Smoker    Smokeless tobacco: Never Used    Alcohol use No    Drug use: No    Sexual activity: Yes     Partners: Male     Birth control/ protection: None     Review of Systems   Constitution: Negative for chills, diaphoresis, fever, weakness and malaise/fatigue.   HENT: Negative for nosebleeds.    Eyes: Negative for blurred vision and double vision.   Cardiovascular: Positive for chest pain. Negative for claudication, cyanosis, dyspnea on exertion, leg swelling, orthopnea, palpitations, paroxysmal nocturnal dyspnea and syncope.   Respiratory: Negative for cough, shortness of breath and wheezing.    Skin: Negative for dry skin and poor wound healing.   Musculoskeletal: Negative for back pain, joint swelling and myalgias.   Gastrointestinal: Negative for abdominal pain, nausea and vomiting.   Genitourinary: Negative for hematuria.   Neurological: Negative for dizziness, headaches, numbness and seizures.   Psychiatric/Behavioral: Negative for altered mental status and depression.     Objective:     Vital Signs (Most Recent):  Temp: 97.6 °F (36.4 °C) (11/30/17 0820)  Pulse: 69 (11/30/17 0820)  Resp: 18 (11/30/17 0820)  BP: (!) 163/91 (11/30/17 0820)  SpO2: 98 % (11/30/17 0820) Vital Signs (24h Range):  Temp:  [96 °F (35.6 °C)-98.5 °F (36.9 °C)] 97.6 °F (36.4 °C)  Pulse:  [69-82] 69  Resp:  [18-20] 18  SpO2:  [93 %-100 %] 98 %  BP:  (123-180)/(68-98) 163/91     Weight: 129.7 kg (285 lb 15 oz)  Body mass index is 44.78 kg/m².    SpO2: 98 %  O2 Device (Oxygen Therapy): nasal cannula      Intake/Output Summary (Last 24 hours) at 11/30/17 1040  Last data filed at 11/29/17 2130   Gross per 24 hour   Intake              120 ml   Output                0 ml   Net              120 ml       Lines/Drains/Airways     Peripheral Intravenous Line                 Peripheral IV - Single Lumen 11/29/17 Right Antecubital 1 day                Physical Exam   Constitutional: She is oriented to person, place, and time. She appears well-developed and well-nourished. No distress.   HENT:   Head: Normocephalic and atraumatic.   Mouth/Throat: No oropharyngeal exudate.   Eyes: Conjunctivae and EOM are normal. Pupils are equal, round, and reactive to light. No scleral icterus.   Neck: Normal range of motion. Neck supple. No JVD present. No tracheal deviation present.   Cardiovascular: Normal rate, regular rhythm, S1 normal and S2 normal.  Exam reveals no gallop and no friction rub.    No murmur heard.  Pulmonary/Chest: Effort normal and breath sounds normal. No respiratory distress. She has no wheezes. She has no rales. She exhibits no tenderness.   Abdominal: Soft. Bowel sounds are normal. There is no tenderness.   obese   Musculoskeletal: Normal range of motion. She exhibits no edema.   Neurological: She is alert and oriented to person, place, and time. No cranial nerve deficit.   Skin: Skin is warm and dry. She is not diaphoretic.   Psychiatric: She has a normal mood and affect. Her behavior is normal. Judgment normal.       Current Medications:   aspirin  325 mg Oral Daily    atorvastatin  80 mg Oral Daily    enoxaparin  1 mg/kg Subcutaneous Q12H    metoprolol tartrate  50 mg Oral BID    polyethylene glycol  17 g Oral Daily    senna-docusate 8.6-50 mg  1 tablet Oral BID    sodium chloride 0.9%  3 mL Intravenous Q8H        acetaminophen, bisacodyl, hydrALAZINE,  influenza, metoprolol, mineral oil, morphine, morphine, nitroGLYCERIN, ondansetron, pneumoc 13-anibal conj-dip cr(PF), promethazine, ramelteon    Laboratory:  CBC:    Recent Labs  Lab 11/20/17  1118   WHITE BLOOD CELL COUNT 6.09   HEMOGLOBIN 12.9   HEMATOCRIT 40.0   PLATELETS 263       CHEMISTRIES:    Recent Labs  Lab 11/20/17  1118 11/30/17  0553   GLUCOSE 100 127 H   SODIUM 139 136   POTASSIUM 4.0 4.5   BUN BLD 9 9   CREATININE 0.8 0.8   EGFR IF  >60 >60   EGFR IF NON- >60 >60   CALCIUM 8.5 L 9.0   MAGNESIUM  --  2.0       CARDIAC BIOMARKERS:    Recent Labs  Lab 11/29/17  2345 11/30/17  0553   TROPONIN I 2.011 H 3.604 H       COAGS:        LIPIDS/LFTS:    Recent Labs  Lab 11/20/17  1118 11/30/17  0553   CHOLESTEROL 194 210 H   TRIGLYCERIDES 65 78   HDL 56 60   LDL CHOLESTEROL 125.0 134.4   NON-HDL CHOLESTEROL 138 150   AST 12 35   ALT 16 19       Lab Results   Component Value Date    HGBA1C 5.8 (H) 11/20/2017         Diagnostic Results:  ECG (personally reviewed tracings):   11/29/17 NSR    Chest X-Ray 11/29/17 NAD    Echo: 11/30/17 (images pers rev)    1 - Normal left ventricular systolic function (EF 55-60%).     2 - No wall motion abnormalities.     3 - Concentric hypertrophy.     4 - Mild mitral regurgitation.     5 - Trivial to mild tricuspid regurgitation.     6 - The estimated PA systolic pressure is 32 mmHg.       Assessment and Plan:     * NSTEMI (non-ST elevated myocardial infarction)    EF normal by echo  Will proceed with cath  Hold enox  Start ASA/Plavix/statin  Risks, benefits and alternatives of the catheterization procedure were discussed with the patient and her family at bedside, which include but are not limited to: bleeding, infection, death, heart attack, arrhythmia, kidney failure, stroke, need for emergency surgery, etc.  Patient understands and and agrees to proceed.  Consent was placed on the chart.              VTE Risk Mitigation         Ordered     Medium  Risk of VTE  Once      11/29/17 9579          Thank you for your consult. I will follow-up with patient. Please contact us if you have any additional questions.    Bandar Dye MD  Cardiology   [facility]

## 2017-11-30 NOTE — BRIEF OP NOTE
[facility]  Brief Operative Note     SUMMARY     Surgery Date: 11/30/2017     Surgeon(s) and Role:     * Bandar Dye MD - Primary    Assisting Surgeon: None    Pre-op Diagnosis:  NSTEMI (non-ST elevated myocardial infarction) [I21.4]    Post-op Diagnosis:  same    Procedure(s) (LRB):  Left heart cath R rad access (Left)    Anesthesia: RN IV Sedation    Description of the findings of the procedure: see below    Findings/Key Components:  LVEDP: 11mmHg  LVEF: 55% by echo  Wall Motion: normal by echo    Dominance: Right  LM: normal  LAD: MLI  LCx: MLI  RCA: MLI   RPDA: ?dissection throughout, T2-3 flow, small caliber/tortuous, not PCI target.    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 Dye 1 week    Estimated Blood Loss: <50cc         Specimens: None

## 2017-11-30 NOTE — NURSING
Report received from CHANG Sears. Pt. resting quietly easily aroused per verbal stimuli. Evaluated pt. general condition. Shift assessment completed. No apparent distress noted.  No verbalization of discomfort. O2 at 2L NC. Bed alarm on. Side rails up x2.

## 2017-11-30 NOTE — SUBJECTIVE & OBJECTIVE
History reviewed. No pertinent past medical history.    Past Surgical History:   Procedure Laterality Date     SECTION      4 kids. All c-sections       Review of patient's allergies indicates:  No Known Allergies    Current Facility-Administered Medications on File Prior to Encounter   Medication    [COMPLETED] aspirin tablet 325 mg    [COMPLETED] enoxaparin injection 130 mg    [COMPLETED] morphine injection 4 mg    [COMPLETED] nitroGLYCERIN 2% TD oint ointment 1 inch    [COMPLETED] ondansetron injection 4 mg    [DISCONTINUED] nitroGLYCERIN SL tablet 0.4 mg     Current Outpatient Prescriptions on File Prior to Encounter   Medication Sig    acetaminophen (TYLENOL) 325 MG tablet Take 1 tablet (325 mg total) by mouth once daily.     Family History     Problem Relation (Age of Onset)    Heart attack Father    Hypertension Mother        Social History Main Topics    Smoking status: Never Smoker    Smokeless tobacco: Never Used    Alcohol use No    Drug use: No    Sexual activity: Yes     Partners: Male     Birth control/ protection: None     Review of Systems   Constitution: Negative for chills, diaphoresis, fever, weakness and malaise/fatigue.   HENT: Negative for nosebleeds.    Eyes: Negative for blurred vision and double vision.   Cardiovascular: Positive for chest pain. Negative for claudication, cyanosis, dyspnea on exertion, leg swelling, orthopnea, palpitations, paroxysmal nocturnal dyspnea and syncope.   Respiratory: Negative for cough, shortness of breath and wheezing.    Skin: Negative for dry skin and poor wound healing.   Musculoskeletal: Negative for back pain, joint swelling and myalgias.   Gastrointestinal: Negative for abdominal pain, nausea and vomiting.   Genitourinary: Negative for hematuria.   Neurological: Negative for dizziness, headaches, numbness and seizures.   Psychiatric/Behavioral: Negative for altered mental status and depression.     Objective:     Vital Signs (Most  Recent):  Temp: 97.6 °F (36.4 °C) (11/30/17 0820)  Pulse: 69 (11/30/17 0820)  Resp: 18 (11/30/17 0820)  BP: (!) 163/91 (11/30/17 0820)  SpO2: 98 % (11/30/17 0820) Vital Signs (24h Range):  Temp:  [96 °F (35.6 °C)-98.5 °F (36.9 °C)] 97.6 °F (36.4 °C)  Pulse:  [69-82] 69  Resp:  [18-20] 18  SpO2:  [93 %-100 %] 98 %  BP: (123-180)/(68-98) 163/91     Weight: 129.7 kg (285 lb 15 oz)  Body mass index is 44.78 kg/m².    SpO2: 98 %  O2 Device (Oxygen Therapy): nasal cannula      Intake/Output Summary (Last 24 hours) at 11/30/17 1040  Last data filed at 11/29/17 2130   Gross per 24 hour   Intake              120 ml   Output                0 ml   Net              120 ml       Lines/Drains/Airways     Peripheral Intravenous Line                 Peripheral IV - Single Lumen 11/29/17 Right Antecubital 1 day                Physical Exam   Constitutional: She is oriented to person, place, and time. She appears well-developed and well-nourished. No distress.   HENT:   Head: Normocephalic and atraumatic.   Mouth/Throat: No oropharyngeal exudate.   Eyes: Conjunctivae and EOM are normal. Pupils are equal, round, and reactive to light. No scleral icterus.   Neck: Normal range of motion. Neck supple. No JVD present. No tracheal deviation present.   Cardiovascular: Normal rate, regular rhythm, S1 normal and S2 normal.  Exam reveals no gallop and no friction rub.    No murmur heard.  Pulmonary/Chest: Effort normal and breath sounds normal. No respiratory distress. She has no wheezes. She has no rales. She exhibits no tenderness.   Abdominal: Soft. Bowel sounds are normal. There is no tenderness.   obese   Musculoskeletal: Normal range of motion. She exhibits no edema.   Neurological: She is alert and oriented to person, place, and time. No cranial nerve deficit.   Skin: Skin is warm and dry. She is not diaphoretic.   Psychiatric: She has a normal mood and affect. Her behavior is normal. Judgment normal.       Current Medications:    aspirin  325 mg Oral Daily    atorvastatin  80 mg Oral Daily    enoxaparin  1 mg/kg Subcutaneous Q12H    metoprolol tartrate  50 mg Oral BID    polyethylene glycol  17 g Oral Daily    senna-docusate 8.6-50 mg  1 tablet Oral BID    sodium chloride 0.9%  3 mL Intravenous Q8H        acetaminophen, bisacodyl, hydrALAZINE, influenza, metoprolol, mineral oil, morphine, morphine, nitroGLYCERIN, ondansetron, pneumoc 13-anibal conj-dip cr(PF), promethazine, ramelteon    Laboratory:  CBC:    Recent Labs  Lab 11/20/17  1118   WHITE BLOOD CELL COUNT 6.09   HEMOGLOBIN 12.9   HEMATOCRIT 40.0   PLATELETS 263       CHEMISTRIES:    Recent Labs  Lab 11/20/17  1118 11/30/17  0553   GLUCOSE 100 127 H   SODIUM 139 136   POTASSIUM 4.0 4.5   BUN BLD 9 9   CREATININE 0.8 0.8   EGFR IF  >60 >60   EGFR IF NON- >60 >60   CALCIUM 8.5 L 9.0   MAGNESIUM  --  2.0       CARDIAC BIOMARKERS:    Recent Labs  Lab 11/29/17  2345 11/30/17  0553   TROPONIN I 2.011 H 3.604 H       COAGS:        LIPIDS/LFTS:    Recent Labs  Lab 11/20/17  1118 11/30/17  0553   CHOLESTEROL 194 210 H   TRIGLYCERIDES 65 78   HDL 56 60   LDL CHOLESTEROL 125.0 134.4   NON-HDL CHOLESTEROL 138 150   AST 12 35   ALT 16 19       Lab Results   Component Value Date    HGBA1C 5.8 (H) 11/20/2017         Diagnostic Results:  ECG (personally reviewed tracings):   11/29/17 NSR    Chest X-Ray 11/29/17 NAD    Echo: 11/30/17 (images pers rev)    1 - Normal left ventricular systolic function (EF 55-60%).     2 - No wall motion abnormalities.     3 - Concentric hypertrophy.     4 - Mild mitral regurgitation.     5 - Trivial to mild tricuspid regurgitation.     6 - The estimated PA systolic pressure is 32 mmHg.

## 2017-11-30 NOTE — ASSESSMENT & PLAN NOTE
BMI 44.7. She was counseled extensively on diet and weight loss. She plans to start going to a gym she is a member of and walking on the treadmill and rejoin weight watchers.

## 2017-11-30 NOTE — ASSESSMENT & PLAN NOTE
· Body mass index is 44.78 kg/m².  · Order a cardiac diet  · Counseling given  · Nutrition consult as an outpatient

## 2017-11-30 NOTE — HPI
Ms. Davalos is a essence 46 yo woman with morbid obesity but otherwise no PMH who presented with chest pain and was found to have an NSTEMI. Please see H&P for full detail.   Dr. Jose

## 2017-11-30 NOTE — PROGRESS NOTES
[facility] Hospital Medicine  Progress Note    Patient Name: Rach Davalos  MRN: [id number]  Patient Class: IP- Inpatient   Admission Date: 11/29/2017  Length of Stay: 1 days  Attending Physician: My Pimentel MD  Primary Care Provider: Hang Luciano MD        Subjective:     Principal Problem:NSTEMI (non-ST elevated myocardial infarction)    HPI:  Ms. Davalos is a essence 46 yo woman with morbid obesity but otherwise no PMH who presented with chest pain and was found to have an NSTEMI. Please see H&P for full detail.    Hospital Course:  She was noted to have troponin elevation and EKG consistent with NSTEMI. Her pain improved with NTG and ASA given in the ER. Cardiology was consulted and she was taken for [facility] 11/30 where she was found to have dissection of the RPDA which was thought to be the culprit vessel as the procedure was otherwise unremarkable. No intervention required. She will be medically optimized with ASA/Plavix/BB/Statin. Plan to monitor overnight and discharge home 12/1/17 to follow up with Dr. Dye in 1 week if stable.     Interval History: no acute events    Review of Systems   Constitutional: Negative for chills and fever.   HENT: Negative for congestion and rhinorrhea.    Eyes: Negative for photophobia and visual disturbance.   Respiratory: Negative for cough and shortness of breath.    Cardiovascular: Positive for chest pain. Negative for leg swelling.   Gastrointestinal: Negative for abdominal pain, nausea and vomiting.   Genitourinary: Negative for difficulty urinating and dysuria.   Musculoskeletal: Negative for joint swelling and neck stiffness.   Skin: Negative for rash and wound.   Neurological: Negative for dizziness and light-headedness.   Psychiatric/Behavioral: Negative for agitation and behavioral problems.     Objective:     Vital Signs (Most Recent):  Temp: 98.1 °F (36.7 °C) (11/30/17 1528)  Pulse: 86 (11/30/17 1658)  Resp: 18 (11/30/17 1528)  BP: (!) 155/87  (11/30/17 1658)  SpO2: 96 % (11/30/17 1658) Vital Signs (24h Range):  Temp:  [96 °F (35.6 °C)-98.5 °F (36.9 °C)] 98.1 °F (36.7 °C)  Pulse:  [69-87] 86  Resp:  [18-20] 18  SpO2:  [93 %-100 %] 96 %  BP: (120-165)/(68-99) 155/87     Weight: 129.7 kg (285 lb 15 oz)  Body mass index is 44.78 kg/m².    Intake/Output Summary (Last 24 hours) at 11/30/17 1731  Last data filed at 11/29/17 2130   Gross per 24 hour   Intake              120 ml   Output                0 ml   Net              120 ml      Physical Exam   Constitutional: She is oriented to person, place, and time. She appears well-developed and well-nourished. No distress.   HENT:   Right Ear: External ear normal.   Left Ear: External ear normal.   Nose: Nose normal.   Eyes: EOM are normal. Pupils are equal, round, and reactive to light. No scleral icterus.   Neck: Normal range of motion. Neck supple. No thyromegaly present.   Cardiovascular: Normal rate and normal heart sounds.  Exam reveals no friction rub.    No murmur heard.  Pulmonary/Chest: Effort normal and breath sounds normal. No respiratory distress. She has no wheezes. She has no rales.   Abdominal: Soft. Bowel sounds are normal. She exhibits no mass. There is no tenderness.   Morbidly obese   Musculoskeletal: Normal range of motion. She exhibits no edema or deformity.   Neurological: She is alert and oriented to person, place, and time. No cranial nerve deficit.   Skin: Skin is warm and dry. No pallor.   Psychiatric: She has a normal mood and affect. Her behavior is normal. Thought content normal.       Significant Labs: All pertinent labs within the past 24 hours have been reviewed.    Significant Imaging: I have reviewed and interpreted all pertinent imaging results/findings within the past 24 hours.    Assessment/Plan:      * NSTEMI (non-ST elevated myocardial infarction)    Appreciate cardiology recs. As noted above in hospital course.        Morbidly obese    BMI 44.7. She was counseled extensively  on diet and weight loss. She plans to start going to a gym she is a member of and walking on the treadmill and rejoin weight watchers.          VTE Risk Mitigation         Ordered     Medium Risk of VTE  Once      11/29/17 4157              My Pimentel MD  Department of Hospital Medicine   [facility]

## 2017-11-30 NOTE — H&P
[facility] Hospital Medicine  History & Physical    Patient Name: Rach Davalos  MRN: [id number]  Admission Date: 11/29/2017  Attending Physician: Bandar Ray MD, MPH      PCP:     Hang Luciano MD    CC:     Transfer for evaluation of chest pain    HISTORY OF PRESENT ILLNESS:     Rach Davalos is a 47 y.o. female that (in part)  has no past medical history on file. Presents to [facility] Emergency Department complaining of chest pain as described below in detail.     Description of symptoms    Location:  Center of chest  Onset:  Acute onset at 8 AM  Character:  Intense heavy, tight, pressure-like pain of moderate to severe intensity.  As high as 9 out of 10.  3 out of 10 after nitroglycerin  Frequency:  First episode  Duration:  Constant duration since 9 AM this morning  Associated Symptoms:  Denies fever, chills, nausea, vomiting, paresthesias, palpitations, syncope, or edema  Radiation:  No radiation  Exacerbating factors:  Minimally exacerbated with exertion.  Relieving factors:  Significant relief given with nitroglycerin in the ED.       In the emergency department routine laboratory studies, EKG, chest x-ray, and cardiac enzymes were obtained.  There is evidence of elevated troponin level of 0.22.  She was given nitroglycerin with positive response and partial relief of chest pain.  Aspirin a supplemental oxygen were provided.     Hospital medicine has been contacted and asked to admit for further evaluation and treatment for non-ST elevation MI.       REVIEW OF SYSTEMS:     -- Constitutional: No fever or chills.  -- Eyes: No visual changes, diplopia, pain, tearing, blind spots, or discharge.   -- Ears, nose, mouth, throat, and face: No congestion, sore throat, epistaxis, d/c, bleeding gums, neck stiffness masses, or dental issues.  -- Respiratory: No cough, shortness of breath, hemoptysis, stridor, wheezing, or night sweats.  -- Cardiovascular: No chest pain,  CORTEZ, syncope, PND, edema, cyanosis, or palpitations.   -- Gastrointestinal: No vomiting, abdominal pain, hematemesis, melena, dyspepsia, or change in bowel habits.  -- Genitourinary: No hematuria, dysuria, frequency, urgency, nocturia, polyuria, stones, or incontinence.  -- Integument/breast: No rash, pruritis, pigmentation changes, dryness, or changes in hair  -- Hematologic/lymphatic: No easy bruising or lymphadenopathy.   -- Musculoskeletal: No acute arthralgias, acute myalgias, joint swelling, acute limitations of ROM, or acute muscular weakness.  -- Neurological: No seizures, headaches, incoordination, paraesthesias, ataxia, vertigo, or tremors.  -- Behavioral/Psych: No auditory or visual hallucinations, depression, or suicidal/homicidal ideations.  -- Endocrine: No heat or cold intolerance, polydipsia, or unintentional weight gain / loss.  -- Allergy/Immunologic: No recurrent infections or adverse reaction to food, insects, or difficulty breathing.    Pain Scale  2 on scale of 1 to 10    PAST MEDICAL / SURGICAL HISTORY:   History reviewed. No pertinent past medical history.  Past Surgical History:   Procedure Laterality Date     SECTION      4 kids. All c-sections         FAMILY HISTORY:     Family History   Problem Relation Age of Onset    Hypertension Mother     Heart attack Father          SOCIAL HISTORY:     Social History   Substance Use Topics    Smoking status: Never Smoker    Smokeless tobacco: Never Used    Alcohol use No     Social History     Social History    Marital status:      Spouse name: N/A    Number of children: N/A    Years of education: N/A     Social History Main Topics    Smoking status: Never Smoker    Smokeless tobacco: Never Used    Alcohol use No    Drug use: No    Sexual activity: Yes     Partners: Male     Birth control/ protection: None     Other Topics Concern    None     Social History Narrative    None         ALLERGIES:       Review of patient's  "allergies indicates:  No Known Allergies      HEALTH SCREENING:     Influenza vaccine not up-to-date for this season.  Prevnar 13 pneumonia vaccine = no evidence of previous vaccination found in the medical record      HOME MEDICATIONS:     Prior to Admission medications    Medication Sig Start Date End Date Taking? Authorizing Provider   acetaminophen (TYLENOL) 325 MG tablet Take 1 tablet (325 mg total) by mouth once daily. 5/6/14   Tian Lauren MD          hospitals MEDICATIONS:     Scheduled Meds:    [START ON 11/30/2017] aspirin  325 mg Oral Daily    [START ON 11/30/2017] atorvastatin  80 mg Oral Daily    [START ON 11/30/2017] enoxaparin  40 mg Subcutaneous Daily    metoprolol tartrate  50 mg Oral BID    [START ON 11/30/2017] polyethylene glycol  17 g Oral Daily    senna-docusate 8.6-50 mg  1 tablet Oral BID    [START ON 11/30/2017] sodium chloride 0.9%  3 mL Intravenous Q8H     Continuous Infusions:    PRN Meds: [START ON 11/30/2017] acetaminophen, [START ON 11/30/2017] bisacodyl, [START ON 11/30/2017] hydrALAZINE, [START ON 11/30/2017] influenza, [START ON 11/30/2017] metoprolol, [START ON 11/30/2017] mineral oil, [START ON 11/30/2017] morphine, [START ON 11/30/2017] morphine, [START ON 11/30/2017] nitroGLYCERIN, [START ON 11/30/2017] ondansetron, [START ON 11/30/2017] pneumoc 13-anibal conj-dip cr(PF), [START ON 11/30/2017] promethazine, [START ON 11/30/2017] ramelteon      PHYSICAL EXAM:     Wt Readings from Last 1 Encounters:   11/29/17 2130 129.7 kg (285 lb 15 oz)     Body mass index is 44.78 kg/m².  Vitals:    11/29/17 2130   BP: 134/74   BP Location: Right arm   Patient Position: Lying   Pulse: 73   Resp: 20   Temp: 96 °F (35.6 °C)   TempSrc: Oral   SpO2: 96%   Weight: 129.7 kg (285 lb 15 oz)   Height: 5' 7" (1.702 m)          -- General appearance: Very pleasant middle-aged female who is lying in bed and appears comfortable.,  Cooperative.  well developed. appears stated age   -- Head: " normocephalic, atraumatic   -- Eyes: conjunctivae clear. Extraocular muscles intact  -- Nose: Nares normal. Septum midline.   -- Mouth/Throat: lips, mucosa, and tongue normal. no throat erythema.   -- Neck: supple, symmetrical, trachea midline, no JVD and thyroid not grossly enlarged, appears symmetric  -- Lungs: clear to auscultation bilaterally. normal respiratory effort. No use of accessory muscles.   -- Chest wall: no tenderness. equal bilateral chest rise   -- Heart: regular rate and regular rhythm. S1, S2 normal.  no click, rub or gallop   -- Abdomen: Morbid obesity, soft, non-tender, non-distended, non-tympanic; bowel sounds normal; megaly exam limited by body habitus  -- Extremities: no cyanosis, clubbing or edema.   -- Pulses: 2+ and symmetric   -- Skin: color normal, texture normal, turgor normal. No rashes or lesions.   -- Neurologic: Normal strength and tone. No focal numbness or weakness. CNII-XII intact. Chetan coma scale: eyes open spontaneously-4, oriented & converses-5, obeys commands-6.      LABORATORY STUDIES:     Troponin is elevated at 0.22.    BNP is 13.8  CBC White blood cell count 8.5, hemoglobin 12.5, hematocrit 39.6, and platelet is 246  CMP sodium 143, potassium 5.3, bicarbonate 27, chloride 104, glucose 96, be UN 11, and creatinine 0.7  INR is 1.1  UA negative leukocytes, negative nitrates, negative ketones, negative glucose    Recent Results (from the past 36 hour(s))   Troponin ISTAT    Collection Time: 11/29/17  4:49 PM   Result Value Ref Range    POC Cardiac Troponin I 0.22 (H) <0.09 ng/mL    Sample HANANE    ISTAT PROCEDURE    Collection Time: 11/29/17  4:50 PM   Result Value Ref Range    POC PTWBT 13.3 9.7 - 14.3 sec    POC PTINR 1.1 0.9 - 1.2    Sample UNK    POCT URINALYSIS W/O SCOPE    Collection Time: 11/29/17  4:58 PM   Result Value Ref Range    Glucose, UA Negative (NG)     Bilirubin, UA Negative (NG)     Ketones, UA Negative (NG)     Spec Grav UA 1.020     Blood, UA Negative  (NG)     PH, UA 7.5     Protein, UA Negative (NG)     Urobilinogen, UA 0.2 E.U./dL    Nitrite, UA Negative (NG)     Leukocytes, UA Negative (NG)     Color, UA Yellow     Clarity, UA Clear    POCT urine pregnancy    Collection Time: 11/29/17  5:01 PM   Result Value Ref Range    POC Preg Test, Ur Negative Negative     Acceptable Yes    POCT B-type natriuretic peptide (BNP)    Collection Time: 11/29/17  5:11 PM   Result Value Ref Range    POC B-Type Natriuretic Peptide 13.8 0.0 - 100.0 pg/mL       No results found for: INR, PROTIME  Lab Results   Component Value Date    HGBA1C 5.8 (H) 11/20/2017     No results for input(s): POCTGLUCOSE in the last 72 hours.        IMAGING:     Chest x-ray PA and lateral      CONSULTS:     IP CONSULT TO CARDIOLOGY       ASSESSMENT & PLAN:     Primary Diagnosis:  NSTEMI (non-ST elevated myocardial infarction)    Active Hospital Problems    Diagnosis  POA    *NSTEMI (non-ST elevated myocardial infarction) [I21.4]  Yes     Priority: 1 - High    Morbidly obese [E66.01]  Yes      Resolved Hospital Problems    Diagnosis Date Resolved POA   No resolved problems to display.         Morbidly obese  · Body mass index is 44.78 kg/m².  · Order a cardiac diet  · Counseling given  · Nutrition consult as an outpatient          NSTEMI (non-ST elevated myocardial infarction)  Acute Chest Pain, rule-out myocardial infarction   · Intitial EKG findings shows no evidence of ST elevation MI  · Initial troponin is above normal limits; Troponin = 0.220  · Low risk for MI;  obesity  · Initiate ACS protocol to rule out MI, then explore noncardiac etiology  · Trend cardiac enzymes  · Aspirin  · Beta-blocker   · Statin  · Supplemental oxygen  · nitroglycerine and morphine PRN  · 2D echocardiogram  · TSH, FT3, FT4  · ESR and cardiac specific CRP   · PT, PTT, INR   · Tight glycemic control  · Monitor on telemetry unit  · Consult cardiologist  · Will admit for non-ST elevation MI         VTE Risk  Mitigation         Ordered     enoxaparin injection 40 mg  Daily     Route:  Subcutaneous        11/29/17 2322     Medium Risk of VTE  Once      11/29/17 2322            Adult PRN medications available   DVT prophylaxis given       DISPOSITION:     Will admit to the Hospital Medicine service for further evaluation and treatment.    Chart reviewed and updated where applicable.    High Risk Conditions:  Patient has a condition that poses threat to life and bodily function: Non-ST elevation MI      ===============================================================    Bandar Ray MD, MPH  Department of Hospital Medicine   [facility]  [phone]  (7pm - 6am)          This note is dictated using Dragon Medical 360 voice recognition software.  There are word recognition mistakes that are occasionally missed on review.

## 2017-11-30 NOTE — NURSING
Pt. prepared for Cath lab procedure. Pt. cleansed with surgical scrub. Wrists and groins clipped bilaterally.Linen and gown changed

## 2017-11-30 NOTE — HOSPITAL COURSE
Ms. Davalos was noted to have troponin elevation and EKG consistent with NSTEMI. Her pain improved with NTG and ASA given in the ER. Cardiology was consulted and she was taken for [facility] 11/30 where she was found to have dissection of the RPDA which was thought to be the culprit vessel as the procedure was otherwise unremarkable. No intervention required. She will be medically optimized with ASA/Plavix/BB/Statin. She was monitored overnight and arranged for follow up with Dr. Dye in 1 week.

## 2017-11-30 NOTE — PROGRESS NOTES
Pre-sedation Assessment:    1. ASA Score: ASA 3 - Patient with moderate systemic disease with functional limitations  2. Mallampati Class: III (soft and hard palate and base of uvula visible)  3. Patient or family history of any reaction to anesthesia or sedation: No  4. Plan for Sedation: Moderate  5. H&P within 30 days of the procedure and updated within 24 hrs of admission or registration: Yes

## 2017-11-30 NOTE — HPI
47 y.o. female who presents to the Emergency Department with  chest pain started at 8 AM today patient reports it as a tightness feels heavy.  Patient states she was in a pre-observation meeting felt she might of been a little stressed was hoping the pain was the end after the meeting it did not.  Patient reports a heaviness got worse around 11 if today.  She tried to belch it didn't help she ate lunch at 11:30 did not improve her pain.  Patient states at the worst the pain was a 9 out of 10 on arrival to the ED the pain as a 7 out of 10.    Above hx confirmed.  Pt currently pain free.  Echo normal.    Risks, benefits and alternatives of the catheterization procedure were discussed with the patient and family at [facility], which include but are not limited to: bleeding, infection, death, heart attack, arrhythmia, kidney failure, stroke, need for emergency surgery, etc.  Patient understands and and agrees to proceed.  Consent was placed on the chart.

## 2017-11-30 NOTE — ASSESSMENT & PLAN NOTE
Acute Chest Pain, rule-out myocardial infarction   · Intitial EKG findings shows no evidence of ST elevation MI  · Initial troponin is above normal limits; Troponin = 0.220  · Low risk for MI;  obesity  · Initiate ACS protocol to rule out MI, then explore noncardiac etiology  · Trend cardiac enzymes  · Aspirin  · Beta-blocker   · Statin  · Supplemental oxygen  · nitroglycerine and morphine PRN  · 2D echocardiogram  · TSH, FT3, FT4  · ESR and cardiac specific CRP   · PT, PTT, INR   · Tight glycemic control  · Monitor on telemetry unit  · Consult cardiologist  · Will admit for non-ST elevation MI

## 2017-11-30 NOTE — PROGRESS NOTES
"Pt arrived from [place] ED via stretcher accompained per EMT"S. Pt ambulated to bed without and difficulty.vital signs taken,telemetry monitor placed on pt with alarm.pt oriented to environment.pt with no c/o of pain or any discomfort.2200 Dr Ray was notifed of pt"s admit  "

## 2017-11-30 NOTE — ED NOTES
Pt supine in bed alert and oriented, pt is pain free at this time, reports nausea at the time of her pain, denies SOB

## 2017-12-01 VITALS
HEIGHT: 67 IN | RESPIRATION RATE: 18 BRPM | DIASTOLIC BLOOD PRESSURE: 76 MMHG | HEART RATE: 76 BPM | TEMPERATURE: 98 F | BODY MASS INDEX: 45.01 KG/M2 | SYSTOLIC BLOOD PRESSURE: 144 MMHG | WEIGHT: 286.81 LBS | OXYGEN SATURATION: 100 %

## 2017-12-01 LAB
ALBUMIN SERPL BCP-MCNC: 2.9 G/DL
ALP SERPL-CCNC: 70 U/L
ALT SERPL W/O P-5'-P-CCNC: 18 U/L
ANION GAP SERPL CALC-SCNC: 5 MMOL/L
ANION GAP SERPL CALC-SCNC: 5 MMOL/L
AST SERPL-CCNC: 24 U/L
BASOPHILS # BLD AUTO: 0.02 K/UL
BASOPHILS NFR BLD: 0.2 %
BILIRUB SERPL-MCNC: 0.4 MG/DL
BUN SERPL-MCNC: 9 MG/DL
BUN SERPL-MCNC: 9 MG/DL
CALCIUM SERPL-MCNC: 9 MG/DL
CALCIUM SERPL-MCNC: 9 MG/DL
CHLORIDE SERPL-SCNC: 106 MMOL/L
CHLORIDE SERPL-SCNC: 106 MMOL/L
CO2 SERPL-SCNC: 27 MMOL/L
CO2 SERPL-SCNC: 27 MMOL/L
CREAT SERPL-MCNC: 0.8 MG/DL
CREAT SERPL-MCNC: 0.8 MG/DL
DIFFERENTIAL METHOD: ABNORMAL
EOSINOPHIL # BLD AUTO: 0.3 K/UL
EOSINOPHIL NFR BLD: 3.4 %
ERYTHROCYTE [DISTWIDTH] IN BLOOD BY AUTOMATED COUNT: 14.8 %
EST. GFR  (AFRICAN AMERICAN): >60 ML/MIN/1.73 M^2
EST. GFR  (AFRICAN AMERICAN): >60 ML/MIN/1.73 M^2
EST. GFR  (NON AFRICAN AMERICAN): >60 ML/MIN/1.73 M^2
EST. GFR  (NON AFRICAN AMERICAN): >60 ML/MIN/1.73 M^2
GLUCOSE SERPL-MCNC: 112 MG/DL
GLUCOSE SERPL-MCNC: 112 MG/DL
HCT VFR BLD AUTO: 40.1 %
HGB BLD-MCNC: 13.1 G/DL
LYMPHOCYTES # BLD AUTO: 2.1 K/UL
LYMPHOCYTES NFR BLD: 24.2 %
MAGNESIUM SERPL-MCNC: 1.9 MG/DL
MCH RBC QN AUTO: 27.5 PG
MCHC RBC AUTO-ENTMCNC: 32.7 G/DL
MCV RBC AUTO: 84 FL
MONOCYTES # BLD AUTO: 0.6 K/UL
MONOCYTES NFR BLD: 7 %
NEUTROPHILS # BLD AUTO: 5.8 K/UL
NEUTROPHILS NFR BLD: 65.2 %
PLATELET # BLD AUTO: 257 K/UL
PMV BLD AUTO: 9.7 FL
POTASSIUM SERPL-SCNC: 4.3 MMOL/L
POTASSIUM SERPL-SCNC: 4.3 MMOL/L
PROT SERPL-MCNC: 7.7 G/DL
RBC # BLD AUTO: 4.77 M/UL
SODIUM SERPL-SCNC: 138 MMOL/L
SODIUM SERPL-SCNC: 138 MMOL/L
WBC # BLD AUTO: 8.86 K/UL

## 2017-12-01 PROCEDURE — 80053 COMPREHEN METABOLIC PANEL: CPT

## 2017-12-01 PROCEDURE — 85025 COMPLETE CBC W/AUTO DIFF WBC: CPT

## 2017-12-01 PROCEDURE — A4216 STERILE WATER/SALINE, 10 ML: HCPCS | Performed by: HOSPITALIST

## 2017-12-01 PROCEDURE — 36415 COLL VENOUS BLD VENIPUNCTURE: CPT

## 2017-12-01 PROCEDURE — 25000003 PHARM REV CODE 250: Performed by: HOSPITALIST

## 2017-12-01 PROCEDURE — 83735 ASSAY OF MAGNESIUM: CPT

## 2017-12-01 PROCEDURE — 25000003 PHARM REV CODE 250: Performed by: INTERNAL MEDICINE

## 2017-12-01 PROCEDURE — 99232 SBSQ HOSP IP/OBS MODERATE 35: CPT | Mod: ,,, | Performed by: INTERNAL MEDICINE

## 2017-12-01 RX ORDER — CLOPIDOGREL BISULFATE 75 MG/1
75 TABLET ORAL DAILY
Qty: 30 TABLET | Refills: 0 | Status: SHIPPED | OUTPATIENT
Start: 2017-12-02 | End: 2018-01-02 | Stop reason: SDUPTHER

## 2017-12-01 RX ORDER — NITROGLYCERIN 0.4 MG/1
0.4 TABLET SUBLINGUAL EVERY 5 MIN PRN
Qty: 30 TABLET | Refills: 0 | Status: SHIPPED | OUTPATIENT
Start: 2017-12-01 | End: 2018-12-01

## 2017-12-01 RX ORDER — ATORVASTATIN CALCIUM 80 MG/1
80 TABLET, FILM COATED ORAL DAILY
Qty: 30 TABLET | Refills: 0 | Status: SHIPPED | OUTPATIENT
Start: 2017-12-02 | End: 2018-01-02 | Stop reason: SDUPTHER

## 2017-12-01 RX ORDER — METOPROLOL TARTRATE 50 MG/1
50 TABLET ORAL 2 TIMES DAILY
Qty: 60 TABLET | Refills: 0 | Status: SHIPPED | OUTPATIENT
Start: 2017-12-01 | End: 2018-01-02 | Stop reason: SDUPTHER

## 2017-12-01 RX ORDER — ASPIRIN 81 MG/1
81 TABLET ORAL DAILY
Refills: 0 | COMMUNITY
Start: 2017-12-02 | End: 2018-12-02

## 2017-12-01 RX ADMIN — ATORVASTATIN CALCIUM 80 MG: 40 TABLET, FILM COATED ORAL at 09:12

## 2017-12-01 RX ADMIN — SODIUM CHLORIDE, PRESERVATIVE FREE 3 ML: 5 INJECTION INTRAVENOUS at 05:12

## 2017-12-01 RX ADMIN — HYDROCODONE BITARTRATE AND ACETAMINOPHEN 1 TABLET: 5; 325 TABLET ORAL at 12:12

## 2017-12-01 RX ADMIN — CLOPIDOGREL BISULFATE 75 MG: 75 TABLET ORAL at 09:12

## 2017-12-01 RX ADMIN — METOPROLOL TARTRATE 50 MG: 50 TABLET ORAL at 09:12

## 2017-12-01 RX ADMIN — DOCUSATE SODIUM AND SENNOSIDES 1 TABLET: 8.6; 5 TABLET, FILM COATED ORAL at 09:12

## 2017-12-01 RX ADMIN — ASPIRIN 81 MG: 81 TABLET, COATED ORAL at 09:12

## 2017-12-01 NOTE — NURSING
Report given to CHANG Sears. Pt. AAOX3. Respirations even and unlabored. No apparent distress noted at this time.Side rails up x 2. Bed alarm set. Call light within patient's reach. Safety measures maintained.      Pressure removed from vas band. Gauze and tape applied to procedure site. Clean dry and intact. All pulses present.

## 2017-12-01 NOTE — SUBJECTIVE & OBJECTIVE
Interval History: No chest pain overnight.  No problems at the arterial access site    Review of Systems   All other systems reviewed and are negative.    Objective:     Vital Signs (Most Recent):  Temp: 98.6 °F (37 °C) (12/01/17 0823)  Pulse: 86 (12/01/17 0823)  Resp: 18 (12/01/17 0823)  BP: (!) 162/88 (12/01/17 0823)  SpO2: 100 % (12/01/17 0823) Vital Signs (24h Range):  Temp:  [97.6 °F (36.4 °C)-98.6 °F (37 °C)] 98.6 °F (37 °C)  Pulse:  [72-90] 86  Resp:  [18-20] 18  SpO2:  [96 %-100 %] 100 %  BP: (119-162)/() 162/88     Weight: 130.1 kg (286 lb 13.1 oz)  Body mass index is 44.92 kg/m².     SpO2: 100 %  O2 Device (Oxygen Therapy): room air      Intake/Output Summary (Last 24 hours) at 12/01/17 1008  Last data filed at 12/01/17 0600   Gross per 24 hour   Intake              120 ml   Output                0 ml   Net              120 ml       Lines/Drains/Airways     Peripheral Intravenous Line                 Peripheral IV - Single Lumen 11/29/17 Right Antecubital 2 days                Physical Exam   Constitutional: She is oriented to person, place, and time. She appears well-developed and well-nourished.   Cardiovascular: Normal rate and regular rhythm.    Pulmonary/Chest: Effort normal and breath sounds normal.   Musculoskeletal: She exhibits no edema.   Neurological: She is alert and oriented to person, place, and time.       Significant Labs:   BMP:   Recent Labs  Lab 11/30/17  0553 12/01/17  0632   * 112*  112*    138  138   K 4.5 4.3  4.3    106  106   CO2 27 27  27   BUN 9 9  9   CREATININE 0.8 0.8  0.8   CALCIUM 9.0 9.0  9.0   MG 2.0 1.9   , CBC   Recent Labs  Lab 12/01/17  0632   WBC 8.86   HGB 13.1   HCT 40.1      , INR No results for input(s): INR, PROTIME in the last 48 hours., Lipid Panel   Recent Labs  Lab 11/30/17  0553   CHOL 210*   HDL 60   LDLCALC 134.4   TRIG 78   CHOLHDL 28.6    and Troponin   Recent Labs  Lab 11/29/17  2345 11/30/17  0553 11/30/17  0624    TROPONINI 2.011* 3.604* 3.611*       Significant Imaging: Echocardiogram:   2D echo with color flow doppler:   Results for orders placed or performed during the hospital encounter of 11/29/17   2D echo with color flow doppler   Result Value Ref Range    EF 55 55 - 65    Mitral Valve Regurgitation MILD     Est. PA Systolic Pressure 31.94     Pericardial Effusion NONE     Mitral Valve Mobility NORMAL     Tricuspid Valve Regurgitation TRIVIAL TO MILD

## 2017-12-01 NOTE — PLAN OF CARE
Problem: Cardiac Catheterization (Diagnostic/Interventional) (Adult)  Intervention: Prevent/Manage Pain   11/30/17 1827   Pain/Comfort/Sleep Interventions   Pain Management Interventions relaxation promoted;quiet environment facilitated;care clustered     Intervention: Prevent/Manage Vascular Access Site Complications   11/30/17 1827   Activity   Activity Type bedrest   Cardiac Interventions   Hemostasis Management mechanical compression device utilized     Intervention: Maintain Extremity in Straight Position Post Procedure   11/30/17 1827   Positioning   Body Position positioned/repositioned independently       Goal: Signs and Symptoms of Listed Potential Problems Will be Absent, Minimized or Managed (Cardiac Catheterization)  Signs and symptoms of listed potential problems will be absent, minimized or managed by discharge/transition of care (reference Cardiac Catheterization (Diagnostic/Interventional) (Adult) CPG).  Outcome: Ongoing (interventions implemented as appropriate)   11/30/17 1827   Cardiac Catheterization (Diagnostic/Interventional)   Problems Assessed (Cardiac Catheterization) all   Problems Present (Cardiac Catheterization) none

## 2017-12-01 NOTE — HOSPITAL COURSE
11/30: Admitted for non-STEMI, underwent left heart catheterization showing likely culprit and distal small vessel PDA --med managed

## 2017-12-01 NOTE — PLAN OF CARE
Problem: Cardiac: ACS (Acute Coronary Syndrome) (Adult)  Intervention: Optimize Myocardial Oxygenation/Perfusion   11/29/17 2130 12/01/17 0600   Activity   Activity Type --  activity adjusted per tolerance;activity clustered for rest period;bedrest   Respiratory Interventions   Airway/Ventilation Management airway patency maintained;calming measures promoted;humidification applied;pulmonary hygiene promoted --      Intervention: Prevent/Manage Thrombi/Emboli   11/30/17 1920   Safety Interventions   Bleeding Precautions blood pressure closely monitored;coagulation study results reviewed;monitored for signs of bleeding   Minimize Embolism Risk   VTE Prevention/Management bleeding risk assessed;dorsiflexion/plantar flexion performed;intravenous hydration   OTHER   VTE Required Core Measure Pharmacological prophylaxis initiated/maintained         Comments: Pt has been free of chest pains or any cardiac discomfort this shift.pt s/p lhc no s/s of pain or impaired circulation to rt wrist. Radial pulses are palpi table..pt was medicated for generalized pain this shift with relief.

## 2017-12-01 NOTE — NURSING
Report received from CHANG Sears. Pt. AAOX3,sitting up in bed. Evaluated pt. general condition. Shift assessment completed. No apparent distress noted.  No verbalization of discomfort. Safety measures maintained.

## 2017-12-01 NOTE — PLAN OF CARE
12/01/17 1008   Final Note   Assessment Type Final Discharge Note   Discharge Disposition Home   Hospital Follow Up  Appt(s) scheduled? Yes   Discharge plans and expectations educations in teach back method with documentation complete? Yes   Right Care Referral Info   Post Acute Recommendation No Care     Follow-up Information     Bandar Dye MD On 12/6/2017.    Specialties:  Cardiology, INTERVENTIONAL CARDIOLOGY  Why:  Appointment scheduled for Wednesday December 6th at 1:40pm  Contact information:  [address] [place] [id number]  [phone]                 1028- call placed to pts nurse Irais that all CM needs have been addressed and that she may proceed with nursing d/c instructions ot complete d/c process.

## 2017-12-01 NOTE — PROGRESS NOTES
Pt care assumed, shift exchange in progress. Pt sitting upright in bed watching tv. Pt aaox4 with no c/o of pain or any discomfort. Pt without s/s of resp. Distress. Pt has a telemetry monitor in place with alarm.7a-7p nurse is removed TR band from rt wrist.Rt radial pulse is palpi table. No s/s of circulation impairment noted. Pt informed of md orders for this shift. Personal items are within reach and safety maintained.nurse call bell is within reach.

## 2017-12-01 NOTE — PROGRESS NOTES
Follow-up Information     Bandar Dye MD On 12/6/2017.    Specialties:  Cardiology, INTERVENTIONAL CARDIOLOGY  Why:  Appointment scheduled for Wednesday December 6th at 1:40pm  Contact information:  [place] [address]  [phone]                 Thank you for choosing [facility] for your care. Within 48-72 hours after leaving the hospital you will receive a call from [facility] Nurses following up to see how you are doing. The team will ask you a few questions and the call will last approximately 20 minutes.     Please answer any calls you may receive from [facility] we want to continue to support you as you manage your healthcare needs. [facility] is happy to have the opportunity to serve you.     [facility] On Call Nurse Care Line - 24/7 Assistance  Registered [facility] nurses can provide appointment booking, health education, clinical advisement, and other advisory services.   Call for this free service at [phone].              Sincerely,   Your [facility] Team,   Jessie De RN/[place]

## 2017-12-01 NOTE — NURSING
Discharge instructions given to patient and spouse at bedside. rETURN TO WORK SLIP GIVEN TO PATIENT. Patient verbalized understanding of instructions. Patient states willingness to comply. Saline lock removed. Tele monitoring removed.

## 2017-12-01 NOTE — PROGRESS NOTES
[facility]  Cardiology  Progress Note    Patient Name: Rach Davalos  MRN: [id number]  Admission Date: 11/29/2017  Hospital Length of Stay: 2 days  Code Status: Full Code   Attending Physician: Faiza Mark MD   Primary Care Physician: Hang Luciano MD  Expected Discharge Date: 12/1/2017  Principal Problem:NSTEMI (non-ST elevated myocardial infarction)    Subjective:     Hospital Course:   11/30: Admitted for non-STEMI, underwent left heart catheterization showing likely culprit and distal small vessel PDA --med managed    Interval History: No chest pain overnight.  No problems at the arterial access site    Review of Systems   All other systems reviewed and are negative.    Objective:     Vital Signs (Most Recent):  Temp: 98.6 °F (37 °C) (12/01/17 0823)  Pulse: 86 (12/01/17 0823)  Resp: 18 (12/01/17 0823)  BP: (!) 162/88 (12/01/17 0823)  SpO2: 100 % (12/01/17 0823) Vital Signs (24h Range):  Temp:  [97.6 °F (36.4 °C)-98.6 °F (37 °C)] 98.6 °F (37 °C)  Pulse:  [72-90] 86  Resp:  [18-20] 18  SpO2:  [96 %-100 %] 100 %  BP: (119-162)/() 162/88     Weight: 130.1 kg (286 lb 13.1 oz)  Body mass index is 44.92 kg/m².     SpO2: 100 %  O2 Device (Oxygen Therapy): room air      Intake/Output Summary (Last 24 hours) at 12/01/17 1008  Last data filed at 12/01/17 0600   Gross per 24 hour   Intake              120 ml   Output                0 ml   Net              120 ml       Lines/Drains/Airways     Peripheral Intravenous Line                 Peripheral IV - Single Lumen 11/29/17 Right Antecubital 2 days                Physical Exam   Constitutional: She is oriented to person, place, and time. She appears well-developed and well-nourished.   Cardiovascular: Normal rate and regular rhythm.    Pulmonary/Chest: Effort normal and breath sounds normal.   Musculoskeletal: She exhibits no edema.   Neurological: She is alert and oriented to person, place, and time.       Significant Labs:   BMP:   Recent Labs  Lab  11/30/17  0553 12/01/17  0632   * 112*  112*    138  138   K 4.5 4.3  4.3    106  106   CO2 27 27  27   BUN 9 9  9   CREATININE 0.8 0.8  0.8   CALCIUM 9.0 9.0  9.0   MG 2.0 1.9   , CBC   Recent Labs  Lab 12/01/17  0632   WBC 8.86   HGB 13.1   HCT 40.1      , INR No results for input(s): INR, PROTIME in the last 48 hours., Lipid Panel   Recent Labs  Lab 11/30/17  0553   CHOL 210*   HDL 60   LDLCALC 134.4   TRIG 78   CHOLHDL 28.6    and Troponin   Recent Labs  Lab 11/29/17  2345 11/30/17  0553 11/30/17  1627   TROPONINI 2.011* 3.604* 3.611*       Significant Imaging: Echocardiogram:   2D echo with color flow doppler:   Results for orders placed or performed during the hospital encounter of 11/29/17   2D echo with color flow doppler   Result Value Ref Range    EF 55 55 - 65    Mitral Valve Regurgitation MILD     Est. PA Systolic Pressure 31.94     Pericardial Effusion NONE     Mitral Valve Mobility NORMAL     Tricuspid Valve Regurgitation TRIVIAL TO MILD      Assessment and Plan:     Brief HPI:     * NSTEMI (non-ST elevated myocardial infarction)    Med manage small vessel CAD culprit  Optimize medicines for secondary prevention            Metabolic syndrome    Per IM        Morbidly obese    Counseled on diet next size  Cardiac rehabilitation referral as an outpatient            VTE Risk Mitigation         Ordered     Medium Risk of VTE  Once      11/29/17 2322        Okay for DC from CV standpoint.  Follow-up with Dr. Dye in one week    Terry Barrera MD  Cardiology  [facility]

## 2017-12-01 NOTE — PROGRESS NOTES
Follow-up Information     Bandar Dye MD On 12/6/2017.    Specialties:  Cardiology, INTERVENTIONAL CARDIOLOGY  Why:  Appointment scheduled for Wednesday December 6th at 1:40pm  Contact information:  [address]   [address]  [place] [address]  [phone]             Hang Luciano MD On 12/15/2017.    Specialty:  Family Medicine  Why:  Appointment schedueld for Friday December 15th at 8am.  Contact information:  [address]  [place] [address]  [phone]               Thank you for choosing [facility] for your care. Within 48-72 hours after leaving the hospital you will receive a call from [facility] Nurses following up to see how you are doing. The team will ask you a few questions and the call will last approximately 20 minutes.     Please answer any calls you may receive from [facility] we want to continue to support you as you manage your healthcare needs. [facility] is happy to have the opportunity to serve you.     [facility] On Call Nurse Care Line - 24/7 Assistance  Registered [facility] nurses can provide appointment booking, health education, clinical advisement, and other advisory services.   Call for this free service at [phone].              Sincerely,   Your [facility] Team,   Jessie De RN/[place]  [phone]

## 2017-12-03 NOTE — DISCHARGE SUMMARY
[facility] Hospital Medicine  Discharge Summary      Patient Name: Rach Davalos  MRN: [id number]  Admission Date: 11/29/2017  Hospital Length of Stay: 2 days  Discharge Date and Time: 12/1/2017 11:51 AM  Attending Physician: Faiza Mark MD  Discharging Provider: Faiza Mark MD  Primary Care Provider: Hang Luciano MD      HPI:   Ms. Davalos is a essence 46 yo woman with morbid obesity but otherwise no PMH who presented with chest pain and was found to have an NSTEMI. Please see H&P for full detail.    Procedure(s) (LRB):  Left heart cath R rad access (Left)      Hospital Course:   Ms. Davalos was noted to have troponin elevation and EKG consistent with NSTEMI. Her pain improved with NTG and ASA given in the ER. Cardiology was consulted and she was taken for [facility] 11/30 where she was found to have dissection of the RPDA which was thought to be the culprit vessel as the procedure was otherwise unremarkable. No intervention required. She will be medically optimized with ASA/Plavix/BB/Statin. She was monitored overnight and arranged for follow up with Dr. Dye in 1 week.      Consults:   Consults         Status Ordering Provider     Inpatient consult to Cardiology  Once     Provider:  (Not yet assigned)    BANDAR Cunningham        Service: Hospital Medicine    Final Active Diagnoses:    Diagnosis Date Noted POA    PRINCIPAL PROBLEM:  NSTEMI (non-ST elevated myocardial infarction) [I21.4] 11/29/2017 Yes    Morbidly obese [E66.01] 04/30/2014 Yes    Metabolic syndrome [E88.81] 04/30/2014 Yes      Problems Resolved During this Admission:    Diagnosis Date Noted Date Resolved POA       Discharged Condition: good    Disposition: Home or Self Care    Follow Up:  Follow-up Information     Bandar Dye MD On 12/6/2017.    Specialties:  Cardiology, INTERVENTIONAL CARDIOLOGY  Why:  Appointment scheduled for Wednesday December 6th at 1:40pm  Contact information:  [address] [place] [address]  [address]  [place] [address]  [phone]             Hang Luciano MD On 12/15/2017.    Specialty:  Family Medicine  Why:  Appointment schedueld for Friday December 15th at 8am.  Contact information:  [address]  [place] [address] [phone]                 Patient Instructions:     Diet general   Order Comments: Cardiac.     Activity as tolerated         Significant Diagnostic Studies:   ECHO (11/30/2017):    1 - Normal left ventricular systolic function (EF 55-60%).     2 - No wall motion abnormalities.     3 - Concentric hypertrophy.     4 - Mild mitral regurgitation.     5 - Trivial to mild tricuspid regurgitation.     6 - The estimated PA systolic pressure is 32 mmHg.     Pending Diagnostic Studies:     None         Medications:  Reconciled Home Medications:   Discharge Medication List as of 12/1/2017 10:52 AM      START taking these medications    Details   aspirin (ECOTRIN) 81 MG EC tablet Take 1 tablet (81 mg total) by mouth once daily., Starting Sat 12/2/2017, Until Sun 12/2/2018, OTC      atorvastatin (LIPITOR) 80 MG tablet Take 1 tablet (80 mg total) by mouth once daily., Starting Sat 12/2/2017, Until Sun 12/2/2018, Normal      clopidogrel (PLAVIX) 75 mg tablet Take 1 tablet (75 mg total) by mouth once daily., Starting Sat 12/2/2017, Until Sun 12/2/2018, Normal      metoprolol tartrate (LOPRESSOR) 50 MG tablet Take 1 tablet (50 mg total) by mouth 2 (two) times daily., Starting Fri 12/1/2017, Until Sat 12/1/2018, Normal      nitroGLYCERIN (NITROSTAT) 0.4 MG SL tablet Place 1 tablet (0.4 mg total) under the tongue every 5 (five) minutes as needed for Chest pain., Starting Fri 12/1/2017, Until Sat 12/1/2018, Normal         CONTINUE these medications which have NOT CHANGED    Details   acetaminophen (TYLENOL) 325 MG tablet Take 1 tablet (325 mg total) by mouth once daily., Starting 5/6/2014, Until Discontinued, Print             Indwelling Lines/Drains at time of discharge:   Lines/Drains/Airways           No matching active lines, drains, or airways          Time spent on the discharge of patient: 35 minutes  Patient was seen and examined on the date of discharge and determined to be suitable for discharge.         Faiza Mark MD  Department of Hospital Medicine  [facility]

## 2017-12-04 ENCOUNTER — PATIENT OUTREACH (OUTPATIENT)
Dept: ADMINISTRATIVE | Facility: CLINIC | Age: 47
End: 2017-12-04

## 2017-12-04 NOTE — Clinical Note
Please forward this important TCC information to your provider in order to maximize the post discharge care delivery of this patient.  C3 nurse spoke with Rach Davalos  for a TCC post hospital discharge follow up call. The patient has a scheduled HOSFU appointment with Hang Luciano MD on 12/15 @ 0800. Respectfully, Yanni Leija RN  [facility]   [email or web address]     Please do not reply to this message, as this inbox is not routinely monitored.

## 2017-12-04 NOTE — PATIENT INSTRUCTIONS
Discharge Instructions for Heart Attack  You have had a heart attack. Also known as acute myocardial infarction, or AMI, a heart attack occurs when a vessel supplying the heart with blood suddenly becomes blocked. Follow these guidelines for home care and lifestyle changes.  Home Care  Take your medications exactly as directed. Dont skip doses.  Remember that recovery after a heart attack takes time. Plan to rest for at least 4-8 weeks while you recover. Then return to normal activity when your doctor says its okay.  Ask your doctor about joining a heart rehabilitation program.  Tell your doctor if you are feeling depressed. Feelings of sadness are common after a heart attack, but it is important that you speak to someone if you are feeling overwhelmed by these feelings.  If you are having chest pain, call 911 for an ambulance. Do NOT drive yourself to the hospital.  Ask your family members to learn CPR.  Learn to take your own blood pressure and pulse. Keep a record of your results. Ask your doctor when you should seek emergency medical attention. He or she will tell you which blood pressure reading is dangerous.  Lifestyle Changes  Maintain a healthy weight. Get help to lose any extra pounds.  Cut back on salt.  Limit canned, dried, packaged, and fast foods.  Dont add salt to your food.  Season foods with herbs instead of salt when you cook.  Break the smoking habit. Enroll in a stop-smoking program to improve your chances of success.  Limit fatty foods.  Check your lipid levels regularly. (Your doctor can show you how to do this.)  Build up your activity according to your doctors recommendation.  Ask your doctor when its okay to resume sexual activity.  Tell your doctor about any erectile dysfunction (ED) medication you are taking. Some ED medications are not safe if you take certain heart medications.  Try to manage stress.  Follow-Up  Make a follow-up appointment as directed by our staff.    When to Seek  Medical Attention  Call 911 right away if you have:  Chest pain that is not relieved by medication.  Shortness of breath.  Otherwise, call your doctor immediately if you have:  Lightheadedness, dizziness, or fainting.  Feeling of irregular heartbeat or fast pulse.   © 2209-8142 [employer]ena Caceres, [address] [place] [address]. All rights reserved. This information is not intended as a substitute for professional medical care. Always follow your healthcare professional's instructions.

## 2017-12-05 ENCOUNTER — TELEPHONE (OUTPATIENT)
Dept: FAMILY MEDICINE | Facility: CLINIC | Age: 47
End: 2017-12-05

## 2017-12-05 NOTE — TELEPHONE ENCOUNTER
----- Message from Marie Bagley sent at 12/5/2017  9:25 AM CST -----  Contact: self  Patient called wanting to know if paperwork for critical illness form can be dropped off to be filled out. Patient was advised from ED that PCP needs it fill out. Please contact her at [phone].    Thanks!

## 2017-12-06 ENCOUNTER — OFFICE VISIT (OUTPATIENT)
Dept: CARDIOLOGY | Facility: CLINIC | Age: 47
End: 2017-12-06
Payer: COMMERCIAL

## 2017-12-06 VITALS
OXYGEN SATURATION: 98 % | SYSTOLIC BLOOD PRESSURE: 132 MMHG | HEIGHT: 67 IN | HEART RATE: 78 BPM | WEIGHT: 282.88 LBS | RESPIRATION RATE: 15 BRPM | DIASTOLIC BLOOD PRESSURE: 74 MMHG | BODY MASS INDEX: 44.4 KG/M2

## 2017-12-06 DIAGNOSIS — I21.4 NSTEMI (NON-ST ELEVATED MYOCARDIAL INFARCTION): Primary | ICD-10-CM

## 2017-12-06 DIAGNOSIS — E78.5 HYPERLIPIDEMIA, UNSPECIFIED HYPERLIPIDEMIA TYPE: ICD-10-CM

## 2017-12-06 DIAGNOSIS — E66.01 MORBIDLY OBESE: ICD-10-CM

## 2017-12-06 PROCEDURE — 99214 OFFICE O/P EST MOD 30 MIN: CPT | Mod: S$GLB,,, | Performed by: INTERNAL MEDICINE

## 2017-12-06 PROCEDURE — 99999 PR PBB SHADOW E&M-EST. PATIENT-LVL III: CPT | Mod: PBBFAC,,, | Performed by: INTERNAL MEDICINE

## 2017-12-06 NOTE — PROGRESS NOTES
CARDIOVASCULAR PROGRESS NOTE    REASON FOR CONSULT:   Rach Davalos is a 47 y.o. female who presents for follow up of CAD/NSTEMI.    PCP: Mustapha  HISTORY OF PRESENT ILLNESS:   The patient returns for follow-up.  She's had no intercurrent angina or dyspnea.  She denies palpitations, lightheadedness, dizziness, or syncope.  There's been no PND, orthopnea, or lower extremity edema.  She denies melena, hematuria, or claudicant symptoms.  She appears to be tolerating her dual antiplatelet therapy without failure.  I explained to her the nature of her coronary event, as well as the reasons for secondary prevention.  We also discussed diet, exercise, sodium avoidance, and weight loss, as well as potential bariatric surgery.    CARDIOVASCULAR HISTORY:   CAD 17 NSTEMI, cath with RPDA ?spont dissection, med rx planned, EF normal    PAST MEDICAL HISTORY:     Past Medical History:   Diagnosis Date    Acute coronary syndrome 2017    ?spont RPDA coronary dissection, not PCI target, EF normal    Hyperlipidemia        PAST SURGICAL HISTORY:     Past Surgical History:   Procedure Laterality Date    CARDIAC CATHETERIZATION       SECTION      4 kids. All c-sections       ALLERGIES AND MEDICATION:   Review of patient's allergies indicates:  No Known Allergies  Previous Medications    ACETAMINOPHEN (TYLENOL) 325 MG TABLET    Take 1 tablet (325 mg total) by mouth once daily.    ASPIRIN (ECOTRIN) 81 MG EC TABLET    Take 1 tablet (81 mg total) by mouth once daily.    ATORVASTATIN (LIPITOR) 80 MG TABLET    Take 1 tablet (80 mg total) by mouth once daily.    CLOPIDOGREL (PLAVIX) 75 MG TABLET    Take 1 tablet (75 mg total) by mouth once daily.    METOPROLOL TARTRATE (LOPRESSOR) 50 MG TABLET    Take 1 tablet (50 mg total) by mouth 2 (two) times daily.    NITROGLYCERIN (NITROSTAT) 0.4 MG SL TABLET    Place 1 tablet (0.4 mg total) under the tongue every 5 (five) minutes as needed for Chest pain.       SOCIAL HISTORY:  "    Social History     Social History    Marital status:      Spouse name: N/A    Number of children: N/A    Years of education: N/A     Occupational History    Not on file.     Social History Main Topics    Smoking status: Never Smoker    Smokeless tobacco: Never Used    Alcohol use No    Drug use: No    Sexual activity: Yes     Partners: Male     Birth control/ protection: None     Other Topics Concern    Not on file     Social History Narrative    No narrative on file       FAMILY HISTORY:     Family History   Problem Relation Age of Onset    Hypertension Mother     Heart attack Father        REVIEW OF SYSTEMS:   Review of Systems   Constitutional: Negative for chills, diaphoresis and fever.   HENT: Negative for nosebleeds.    Eyes: Negative for blurred vision, double vision and photophobia.   Respiratory: Negative for hemoptysis, shortness of breath and wheezing.    Cardiovascular: Negative for chest pain, palpitations, orthopnea, claudication, leg swelling and PND.   Gastrointestinal: Negative for abdominal pain, blood in stool, heartburn, melena, nausea and vomiting.   Genitourinary: Negative for flank pain and hematuria.   Musculoskeletal: Negative for falls, myalgias and neck pain.   Skin: Negative for rash.   Neurological: Negative for dizziness, seizures, loss of consciousness, weakness and headaches.   Endo/Heme/Allergies: Negative for polydipsia. Does not bruise/bleed easily.   Psychiatric/Behavioral: Negative for depression and memory loss. The patient is not nervous/anxious.        PHYSICAL EXAM:     Vitals:    12/06/17 1347   BP: 132/74   Pulse: 78   Resp: 15    Body mass index is 44.3 kg/m².  Weight: 128.3 kg (282 lb 13.6 oz)   Height: 5' 7" (170.2 cm)     Physical Exam   Constitutional: She is oriented to person, place, and time. She appears well-developed and well-nourished. She is cooperative.  Non-toxic appearance. No distress.   HENT:   Head: Normocephalic and atraumatic. "   Eyes: Conjunctivae and EOM are normal. Pupils are equal, round, and reactive to light. No scleral icterus.   Neck: Trachea normal. Neck supple. Normal carotid pulses and no JVD present. Carotid bruit is not present. No neck rigidity. No edema present. No thyromegaly present.   Cardiovascular: Normal rate, regular rhythm, S1 normal and S2 normal.  PMI is not displaced.  Exam reveals no gallop and no friction rub.    No murmur heard.  Pulses:       Carotid pulses are 2+ on the right side, and 2+ on the left side.  R radial access site c/d/i   Pulmonary/Chest: Effort normal and breath sounds normal. No respiratory distress. She has no wheezes. She has no rales. She exhibits no tenderness.   Abdominal: Soft. Bowel sounds are normal. She exhibits no distension and no mass. There is no hepatosplenomegaly. There is no tenderness.   obese   Musculoskeletal: She exhibits no edema or tenderness.   Feet:   Right Foot:   Skin Integrity: Negative for ulcer.   Left Foot:   Skin Integrity: Negative for ulcer.   Neurological: She is alert and oriented to person, place, and time. No cranial nerve deficit.   Skin: Skin is warm and dry. No rash noted. No erythema.   Psychiatric: She has a normal mood and affect. Her speech is normal and behavior is normal.   Vitals reviewed.      DATA:   EKG: (personally reviewed tracing)  11/29/17 NSR    Laboratory:  CBC:    Recent Labs  Lab 11/20/17  1118 12/01/17  0632   WHITE BLOOD CELL COUNT 6.09 8.86   HEMOGLOBIN 12.9 13.1   HEMATOCRIT 40.0 40.1   PLATELETS 263 257       CHEMISTRIES:    Recent Labs  Lab 11/20/17  1118 11/30/17  0553 12/01/17  0632   GLUCOSE 100 127 H 112 H  112 H   SODIUM 139 136 138  138   POTASSIUM 4.0 4.5 4.3  4.3   BUN BLD 9 9 9  9   CREATININE 0.8 0.8 0.8  0.8   EGFR IF AFRICAN AMERICAN >60 >60 >60  >60   EGFR IF NON- >60 >60 >60  >60   CALCIUM 8.5 L 9.0 9.0  9.0   MAGNESIUM  --  2.0 1.9       CARDIAC BIOMARKERS:    Recent Labs  Lab 11/29/17  3836  11/30/17  0553 11/30/17  1627   TROPONIN I 2.011 H 3.604 H 3.611 H       COAGS:        LIPIDS/LFTS:    Recent Labs  Lab 11/20/17  1118 11/30/17  0553 12/01/17  0632   CHOLESTEROL 194 210 H  --    TRIGLYCERIDES 65 78  --    HDL 56 60  --    LDL CHOLESTEROL 125.0 134.4  --    NON-HDL CHOLESTEROL 138 150  --    AST 12 35 24   ALT 16 19 18       Cardiovascular Testing:  Cath 11/30/17  LVEDP: 11mmHg  LVEF: 55% by echo  Wall Motion: normal by echo  Dominance: Right  LM: normal  LAD: MLI  LCx: MLI  RCA: MLI              RPDA: ?dissection throughout, T2-3 flow, small caliber/tortuous, not PCI target.  Hemostasis:  R Radial band  Impression:  NSTEMI  1V CAD, normal LV fxn  RPDA culprit (?spontaneous dissection), not PCI target  Plan:  Med rx  Cont ASA/Plavix/BBl/Statin  Home in am  Follow up with Dr. Dye 1 week    Echo: 11/30/17    1 - Normal left ventricular systolic function (EF 55-60%).     2 - No wall motion abnormalities.     3 - Concentric hypertrophy.     4 - Mild mitral regurgitation.     5 - Trivial to mild tricuspid regurgitation.     6 - The estimated PA systolic pressure is 32 mmHg.     ASSESSMENT:   # CAD/NSTEMI/?spont RPDA dissection 11/2017  # HLP, on atorva 80mg  # BMI 44    PLAN:   Cont med rx  Plavix thru 11/2018  Check lipids/LFT 3 months (early March 2018)  Diet/exercise/weight loss, bariatric info session flyer given to pt  Cardiac Rehab  RTC 3 months      Bandar Dye MD, [facility]

## 2017-12-11 ENCOUNTER — TELEPHONE (OUTPATIENT)
Dept: CARDIOLOGY | Facility: CLINIC | Age: 47
End: 2017-12-11

## 2017-12-15 ENCOUNTER — OFFICE VISIT (OUTPATIENT)
Dept: FAMILY MEDICINE | Facility: CLINIC | Age: 47
End: 2017-12-15
Payer: COMMERCIAL

## 2017-12-15 ENCOUNTER — LAB VISIT (OUTPATIENT)
Dept: LAB | Facility: HOSPITAL | Age: 47
End: 2017-12-15
Attending: FAMILY MEDICINE
Payer: COMMERCIAL

## 2017-12-15 VITALS
OXYGEN SATURATION: 97 % | HEIGHT: 64 IN | SYSTOLIC BLOOD PRESSURE: 115 MMHG | TEMPERATURE: 99 F | HEART RATE: 83 BPM | WEIGHT: 282.19 LBS | DIASTOLIC BLOOD PRESSURE: 77 MMHG | BODY MASS INDEX: 48.18 KG/M2

## 2017-12-15 DIAGNOSIS — I25.42 SPONTANEOUS DISSECTION OF CORONARY ARTERY: ICD-10-CM

## 2017-12-15 DIAGNOSIS — E78.5 HYPERLIPIDEMIA, UNSPECIFIED HYPERLIPIDEMIA TYPE: ICD-10-CM

## 2017-12-15 DIAGNOSIS — I21.4 NSTEMI (NON-ST ELEVATED MYOCARDIAL INFARCTION): Primary | ICD-10-CM

## 2017-12-15 DIAGNOSIS — I21.4 NSTEMI (NON-ST ELEVATED MYOCARDIAL INFARCTION): ICD-10-CM

## 2017-12-15 LAB
APTT BLDCRRT: 25.4 SEC
CRP SERPL-MCNC: 8.2 MG/L
ESTIMATED AVG GLUCOSE: 120 MG/DL
HBA1C MFR BLD HPLC: 5.8 %
INR PPP: 1
PROTHROMBIN TIME: 10.4 SEC
RHEUMATOID FACT SERPL-ACNC: <10 IU/ML

## 2017-12-15 PROCEDURE — 86140 C-REACTIVE PROTEIN: CPT

## 2017-12-15 PROCEDURE — 85730 THROMBOPLASTIN TIME PARTIAL: CPT

## 2017-12-15 PROCEDURE — 83036 HEMOGLOBIN GLYCOSYLATED A1C: CPT

## 2017-12-15 PROCEDURE — 99213 OFFICE O/P EST LOW 20 MIN: CPT | Mod: S$GLB,,, | Performed by: FAMILY MEDICINE

## 2017-12-15 PROCEDURE — 36415 COLL VENOUS BLD VENIPUNCTURE: CPT | Mod: PO

## 2017-12-15 PROCEDURE — 86431 RHEUMATOID FACTOR QUANT: CPT

## 2017-12-15 PROCEDURE — 86038 ANTINUCLEAR ANTIBODIES: CPT

## 2017-12-15 PROCEDURE — 99999 PR PBB SHADOW E&M-EST. PATIENT-LVL III: CPT | Mod: PBBFAC,,, | Performed by: FAMILY MEDICINE

## 2017-12-15 PROCEDURE — 86147 CARDIOLIPIN ANTIBODY EA IG: CPT | Mod: 59

## 2017-12-15 PROCEDURE — 85610 PROTHROMBIN TIME: CPT

## 2017-12-15 NOTE — LETTER
December 15, 2017      [facility] [address]  [place] [address]  Phone: [phone]  Fax: [phone]       Patient: Rach Davalos   YOB: 1970  Date of Visit: 12/15/2017    To Whom It May Concern:    Lexy Davalos  was at [facility] on 12/15/2017. She may return to work/school on 12/16/17 with no restrictions. If you have any questions or concerns, or if I can be of further assistance, please do not hesitate to contact me.    Sincerely,    Hang Luciano MD

## 2017-12-15 NOTE — PROGRESS NOTES
Chief Complaint   Patient presents with    Transitional Care       HPI  Rach Davalos is a 47 y.o. female with multiple medical diagnoses as listed in the medical history and problem list that presents for hospital follow up.      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 Started on Asa/plavix, BB, statin. Pt has questions today about current diagnosis.     Fam hx: CAD, HTN    Pt is known to me and was last seen by me on 2017.    PAST MEDICAL HISTORY:  Past Medical History:   Diagnosis Date    Acute coronary syndrome 2017    ?spont RPDA coronary dissection, not PCI target, EF normal    Hyperlipidemia        PAST SURGICAL HISTORY:  Past Surgical History:   Procedure Laterality Date    CARDIAC CATHETERIZATION       SECTION      4 kids. All c-sections       SOCIAL HISTORY:  Social History     Social History    Marital status:      Spouse name: N/A    Number of children: N/A    Years of education: N/A     Occupational History    Not on file.     Social History Main Topics    Smoking status: Never Smoker    Smokeless tobacco: Never Used    Alcohol use No    Drug use: No    Sexual activity: Yes     Partners: Male     Birth control/ protection: None     Other Topics Concern    Not on file     Social History Narrative    No narrative on file       FAMILY HISTORY:  Family History   Problem Relation Age of Onset    Hypertension Mother     Heart attack Father        ALLERGIES AND MEDICATIONS: updated and reviewed.  Review of patient's allergies indicates:  No Known Allergies  Current Outpatient Prescriptions   Medication Sig Dispense Refill    aspirin (ECOTRIN) 81 MG EC tablet Take 1 tablet (81 mg total) by mouth once daily.  0    atorvastatin (LIPITOR) 80 MG tablet Take 1 tablet (80 mg total) by mouth once daily. 30 tablet 0    clopidogrel (PLAVIX) 75 mg tablet Take 1 tablet (75 mg total) by mouth once daily. 30 tablet 0     "metoprolol tartrate (LOPRESSOR) 50 MG tablet Take 1 tablet (50 mg total) by mouth 2 (two) times daily. 60 tablet 0    nitroGLYCERIN (NITROSTAT) 0.4 MG SL tablet Place 1 tablet (0.4 mg total) under the tongue every 5 (five) minutes as needed for Chest pain. 30 tablet 0    acetaminophen (TYLENOL) 325 MG tablet Take 1 tablet (325 mg total) by mouth once daily. 30 tablet 0     No current facility-administered medications for this visit.        ROS  Review of Systems   Constitutional: Negative for activity change, appetite change and fever.   HENT: Negative for congestion and sore throat.    Eyes: Negative for visual disturbance.   Respiratory: Negative for cough and shortness of breath.    Cardiovascular: Negative for chest pain.   Gastrointestinal: Negative for abdominal pain, diarrhea, nausea and vomiting.   Endocrine: Negative.    Genitourinary: Negative for dysuria.   Musculoskeletal: Negative for arthralgias and back pain.   Skin: Negative for rash.   Allergic/Immunologic: Negative.    Neurological: Negative for dizziness, weakness and headaches.   Hematological: Negative.    Psychiatric/Behavioral: Negative for agitation and confusion.       Physical Exam  Vitals:    12/15/17 0816   BP: 115/77   Pulse: 83   Temp: 99.4 °F (37.4 °C)    Body mass index is 48.44 kg/m².  Weight: 128 kg (282 lb 3 oz)   Height: 5' 4" (162.6 cm)     Physical Exam   Constitutional: She is oriented to person, place, and time. She appears well-developed and well-nourished.   HENT:   Head: Normocephalic and atraumatic.   Eyes: Conjunctivae and EOM are normal. Pupils are equal, round, and reactive to light.   Neck: Normal range of motion. Neck supple.   Cardiovascular: Normal rate, regular rhythm and normal heart sounds.    Pulmonary/Chest: Effort normal and breath sounds normal.   Abdominal: Soft. Bowel sounds are normal.   Musculoskeletal: Normal range of motion.   Neurological: She is alert and oriented to person, place, and time. She has " normal reflexes.   Skin: Skin is warm and dry.   Psychiatric: She has a normal mood and affect. Her behavior is normal. Judgment and thought content normal.       Health Maintenance       Date Due Completion Date    Pap Smear with HPV Cotest 05/15/2017 5/15/2014    Influenza Vaccine 08/01/2017 ---    Mammogram 11/20/2018 11/20/2017    TETANUS VACCINE 10/25/2019 10/25/2009 (Done)    Override on 10/25/2009: Done    Lipid Panel 11/30/2022 11/30/2017    Override on 4/26/2014: Done          Assessment & Plan    NSTEMI (non-ST elevated myocardial infarction)  -     NANCIE; Future; Expected date: 12/15/2017  -     Rheumatoid factor; Future; Expected date: 12/15/2017  -     C-reactive protein; Future; Expected date: 12/15/2017  -     ANTIPHOSPHOLIPID AB (ANTICARDIOLIPIN); Future; Expected date: 12/15/2017  -     Protime-INR; Future; Expected date: 12/15/2017  -     APTT; Future; Expected date: 12/15/2017  - Will assess labs    Spontaneous dissection of coronary artery  -     NANCIE; Future; Expected date: 12/15/2017  -     Rheumatoid factor; Future; Expected date: 12/15/2017  -     C-reactive protein; Future; Expected date: 12/15/2017  -     ANTIPHOSPHOLIPID AB (ANTICARDIOLIPIN); Future; Expected date: 12/15/2017  -     Protime-INR; Future; Expected date: 12/15/2017  -     APTT; Future; Expected date: 12/15/2017  - Will assess labs  - Follow up with Cardiology as scheduled    Hyperlipidemia, unspecified hyperlipidemia type  - Continue current medication regimen as prescribed        Return in about 4 weeks (around 1/12/2018), or if symptoms worsen or fail to improve.

## 2017-12-18 LAB — ANA SER QL IF: NORMAL

## 2017-12-19 LAB
CARDIOLIPIN IGG SER IA-ACNC: <9.4 GPL
CARDIOLIPIN IGM SER IA-ACNC: 10.63 MPL

## 2018-01-01 ENCOUNTER — NURSE TRIAGE (OUTPATIENT)
Dept: ADMINISTRATIVE | Facility: CLINIC | Age: 48
End: 2018-01-01

## 2018-01-01 NOTE — TELEPHONE ENCOUNTER
MI 11/29  Pt called re refills. Plavix- completely out, lipitor, metoprolol. Spoke with dr diaz. rec for pt to contact office in am. Office message sent. Pt notified. Call back with questions.     Reason for Disposition   Caller has URGENT medication question about med that PCP prescribed and triager unable to answer question    Protocols used: ST MEDICATION QUESTION CALL-A-AH

## 2018-01-02 RX ORDER — ATORVASTATIN CALCIUM 80 MG/1
80 TABLET, FILM COATED ORAL DAILY
Qty: 90 TABLET | Refills: 3 | Status: SHIPPED | OUTPATIENT
Start: 2018-01-02 | End: 2019-03-25 | Stop reason: SDUPTHER

## 2018-01-02 RX ORDER — METOPROLOL TARTRATE 50 MG/1
50 TABLET ORAL 2 TIMES DAILY
Qty: 180 TABLET | Refills: 3 | Status: SHIPPED | OUTPATIENT
Start: 2018-01-02 | End: 2019-03-25 | Stop reason: SDUPTHER

## 2018-01-02 RX ORDER — CLOPIDOGREL BISULFATE 75 MG/1
75 TABLET ORAL DAILY
Qty: 90 TABLET | Refills: 3 | Status: SHIPPED | OUTPATIENT
Start: 2018-01-02 | End: 2019-01-02

## 2018-02-14 ENCOUNTER — TELEPHONE (OUTPATIENT)
Dept: CARDIAC REHAB | Facility: CLINIC | Age: 48
End: 2018-02-14

## 2018-02-14 NOTE — TELEPHONE ENCOUNTER
----- Message from Chelita Juliankarin sent at 2/14/2018  2:21 PM CST -----  Contact: pt   Pt called and wanted to set up cardiac rehab with [facility].  She has been with Asad Medina.  She can be reached @ [phone].  Thanks!!

## 2018-02-21 ENCOUNTER — OFFICE VISIT (OUTPATIENT)
Dept: FAMILY MEDICINE | Facility: CLINIC | Age: 48
End: 2018-02-21
Payer: COMMERCIAL

## 2018-02-21 VITALS
TEMPERATURE: 98 F | HEART RATE: 74 BPM | BODY MASS INDEX: 48.32 KG/M2 | WEIGHT: 283.06 LBS | HEIGHT: 64 IN | OXYGEN SATURATION: 98 % | RESPIRATION RATE: 18 BRPM | SYSTOLIC BLOOD PRESSURE: 118 MMHG | DIASTOLIC BLOOD PRESSURE: 80 MMHG

## 2018-02-21 DIAGNOSIS — M54.9 ACUTE RIGHT-SIDED BACK PAIN, UNSPECIFIED BACK LOCATION: Primary | ICD-10-CM

## 2018-02-21 PROCEDURE — 3008F BODY MASS INDEX DOCD: CPT | Mod: S$GLB,,, | Performed by: NURSE PRACTITIONER

## 2018-02-21 PROCEDURE — 99214 OFFICE O/P EST MOD 30 MIN: CPT | Mod: S$GLB,,, | Performed by: NURSE PRACTITIONER

## 2018-02-21 PROCEDURE — 99999 PR PBB SHADOW E&M-EST. PATIENT-LVL III: CPT | Mod: PBBFAC,,, | Performed by: NURSE PRACTITIONER

## 2018-02-21 RX ORDER — KETOROLAC TROMETHAMINE 30 MG/ML
30 INJECTION, SOLUTION INTRAMUSCULAR; INTRAVENOUS
Status: CANCELLED | OUTPATIENT
Start: 2018-02-21 | End: 2018-02-21

## 2018-02-21 RX ORDER — METHOCARBAMOL 750 MG/1
750 TABLET, FILM COATED ORAL 4 TIMES DAILY PRN
Qty: 45 TABLET | Refills: 0 | Status: CANCELLED | OUTPATIENT
Start: 2018-02-21 | End: 2018-03-23

## 2018-02-21 RX ORDER — IBUPROFEN 600 MG/1
600 TABLET ORAL 3 TIMES DAILY PRN
Qty: 45 TABLET | Refills: 0 | Status: CANCELLED | OUTPATIENT
Start: 2018-02-21 | End: 2018-03-23

## 2018-02-21 NOTE — LETTER
February 21, 2018      [facility]  [address]  [place] [address]  Phone: [phone]       Patient: Rach Davalos   YOB: 1970  Date of Visit: 02/21/2018    To Whom It May Concern:    Lexy Davalos  was at [facility] on 02/21/2018.  SHE may return to work on 2/21/2018 With/no restrictions. If you have any questions or concerns, or if I can be of further assistance, please do not hesitate to contact me.    Sincerely,    Sydni Hassan MA

## 2018-02-21 NOTE — PROGRESS NOTES
Subjective:       Patient ID: Rach Davalos is a 47 y.o. female.    Chief Complaint: Back Pain    HPI Ms Davalos is here because she noticed some R scapular pain this am.  She denies any specific inciting event, other than her mattress is bad.  It does not radiate, she does not have any CP or SOB. No treatment attempted. She reports medication compliance  Review of Systems   Constitutional: Negative for fever.   Respiratory: Negative.    Cardiovascular: Negative.    Musculoskeletal: Positive for back pain.       Objective:      Physical Exam   Constitutional: She is oriented to person, place, and time. She appears well-developed and well-nourished. No distress.   Cardiovascular: Normal rate, regular rhythm and normal heart sounds.  Exam reveals no friction rub.    No murmur heard.  Pulmonary/Chest: Effort normal and breath sounds normal. No respiratory distress. She has no wheezes. She has no rales.           Musculoskeletal: Normal range of motion.   Neurological: She is alert and oriented to person, place, and time.   Skin: Skin is warm and dry.   Psychiatric: She has a normal mood and affect. Her behavior is normal.   Vitals reviewed.      Assessment:       1. Acute right-sided back pain, unspecified back location        Plan:       Acute right-sided back pain, unspecified back location  Most likely msk, she is concerned because of her h/o MI, but she denies CP, SOB, her vitals are WNL, and she reports medication compliance.    Follow up with primary care provider if not improved  Go to ER for new, worse or concerning symptoms

## 2018-02-21 NOTE — PATIENT INSTRUCTIONS
Follow up with primary care provider if not improved  Go to ER for new, worse or concerning symptoms    Back Care Tips    Caring for your back  These are things you can do to prevent a recurrence of acute back pain and to reduce symptoms from chronic back pain:  · Maintain a healthy weight. If you are overweight, losing weight will help most types of back pain.  · Exercise is an important part of recovery from most types of back pain. The muscles behind and in front of the spine support the back. This means strengthening both the back muscles and the abdominal muscles will provide better support for your spine.   · Swimming and brisk walking are good overall exercises to improve your fitness level.  · Practice safe lifting methods (below).  · Practice good posture when sitting, standing and walking. Avoid prolonged sitting. This puts more stress on the lower back than standing or walking.  · Wear quality shoes with sufficient arch support. Foot and ankle alignment can affect back symptoms. Women should avoid wearing high heels.  · Therapeutic massage can help relax the back muscles without stretching them.  · During the first 24 to 72 hours after an acute injury or flare-up of chronic back pain, apply an ice pack to the painful area for 20 minutes and then remove it for 20 minutes, over a period of 60 to 90 minutes, or several times a day. As a safety precaution, do not use a heating pad at bedtime. Sleeping on a heating pad can lead to skin burns or tissue damage.  · You can alternate ice and heat therapies.  Medications  Talk to your healthcare provider before using medicines, especially if you have other medical problems or are taking other medicines.  · You may use acetaminophen or ibuprofen to control pain, unless your healthcare provider prescribed other pain medicine. If you have chronic conditions like diabetes, liver or kidney disease, stomach ulcers, or gastrointestinal bleeding, or are taking blood  thinners, talk with your healthcare provider before taking any medicines.  · Be careful if you are given prescription pain medicines, narcotics, or medicine for muscle spasm. They can cause drowsiness, affect your coordination, reflexes, and judgment. Do not drive or operate heavy machinery while taking these types of medicines. Take prescription pain medicine only as prescribed by your healthcare provider.  Lumbar stretch  Here is a simple stretching exercise that will help relax muscle spasm and keep your back more limber. If exercise makes your back pain worse, dont do it.  · Lie on your back with your knees bent and both feet on the ground.  · Slowly raise your left knee to your chest as you flatten your lower back against the floor. Hold for 5 seconds.  · Relax and repeat the exercise with your right knee.  · Do 10 of these exercises for each leg.  Safe lifting method  · Dont bend over at the waist to lift an object off the floor.  Instead, bend your knees and hips in a squat.   · Keep your back and head upright  · Hold the object close to your body, directly in front of you.  · Straighten your legs to lift the object.   · Lower the object to the floor in the reverse fashion.  · If you must slide something across the floor, push it.  Posture tips  Sitting  Sit in chairs with straight backs or low-back support. Keep your knees lower than your hips, with your feet flat on the floor.  When driving, sit up straight. Adjust the seat forward so you are not leaning toward the steering wheel.  A small pillow or rolled towel behind your lower back may help if you are driving long distances.   Standing  When standing for long periods, shift most of your weight to one leg at a time. Alternate legs every few minutes.   Sleeping  The best way to sleep is on your side with your knees bent. Put a low pillow under your head to support your neck in a neutral spine position. Avoid thick pillows that bend your neck to one side.  Put a pillow between your legs to further relax your lower back. If you sleep on your back, put pillows under your knees to support your legs in a slightly flexed position. Use a firm mattress. If your mattress sags, replace it, or use a 1/2-inch plywood board under the mattress to add support.  Follow-up care  Follow up with your healthcare provider, or as advised.  If X-rays, a CT scan or an MRI scan were taken, they will be reviewed by a radiologist. You will be notified of any new findings that may affect your care.  Call 911  Seek emergency medical care if any of the following occur:  · Trouble breathing  · Confusion  · Very drowsy  · Fainting or loss of consciousness  · Rapid or very slow heart rate  · Loss of  bowel or bladder control  When to seek medical care  Call your healthcare provider if any of the following occur:  · Pain becomes worse or spreads to your arms or legs  · Weakness or numbness in one or both arms or legs  · Numbness in the groin area  Date Last Reviewed: 6/1/2016  © 7237-4497 [employer]. [address] [place] [id number]. All rights reserved. This information is not intended as a substitute for professional medical care. Always follow your healthcare professional's instructions.

## 2018-03-01 ENCOUNTER — LAB VISIT (OUTPATIENT)
Dept: LAB | Facility: HOSPITAL | Age: 48
End: 2018-03-01
Attending: INTERNAL MEDICINE
Payer: COMMERCIAL

## 2018-03-01 DIAGNOSIS — E78.5 HYPERLIPIDEMIA, UNSPECIFIED HYPERLIPIDEMIA TYPE: ICD-10-CM

## 2018-03-01 LAB
ALBUMIN SERPL BCP-MCNC: 3.2 G/DL
ALP SERPL-CCNC: 69 U/L
ALT SERPL W/O P-5'-P-CCNC: 109 U/L
AST SERPL-CCNC: 57 U/L
BILIRUB DIRECT SERPL-MCNC: 0.2 MG/DL
BILIRUB SERPL-MCNC: 0.3 MG/DL
CHOLEST SERPL-MCNC: 99 MG/DL
CHOLEST/HDLC SERPL: 2.4 {RATIO}
HDLC SERPL-MCNC: 42 MG/DL
HDLC SERPL: 42.4 %
LDLC SERPL CALC-MCNC: 45.6 MG/DL
NONHDLC SERPL-MCNC: 57 MG/DL
PROT SERPL-MCNC: 7.2 G/DL
TRIGL SERPL-MCNC: 57 MG/DL

## 2018-03-01 PROCEDURE — 80076 HEPATIC FUNCTION PANEL: CPT

## 2018-03-01 PROCEDURE — 36415 COLL VENOUS BLD VENIPUNCTURE: CPT

## 2018-03-01 PROCEDURE — 80061 LIPID PANEL: CPT

## 2018-03-07 ENCOUNTER — OFFICE VISIT (OUTPATIENT)
Dept: CARDIOLOGY | Facility: CLINIC | Age: 48
End: 2018-03-07
Payer: COMMERCIAL

## 2018-03-07 VITALS
BODY MASS INDEX: 47.88 KG/M2 | DIASTOLIC BLOOD PRESSURE: 91 MMHG | OXYGEN SATURATION: 99 % | HEART RATE: 65 BPM | WEIGHT: 280.44 LBS | SYSTOLIC BLOOD PRESSURE: 147 MMHG | RESPIRATION RATE: 15 BRPM | HEIGHT: 64 IN

## 2018-03-07 DIAGNOSIS — I25.42 SPONTANEOUS DISSECTION OF CORONARY ARTERY: Primary | ICD-10-CM

## 2018-03-07 DIAGNOSIS — I10 ESSENTIAL HYPERTENSION: ICD-10-CM

## 2018-03-07 DIAGNOSIS — E66.01 MORBIDLY OBESE: ICD-10-CM

## 2018-03-07 DIAGNOSIS — E78.5 HYPERLIPIDEMIA, UNSPECIFIED HYPERLIPIDEMIA TYPE: ICD-10-CM

## 2018-03-07 PROCEDURE — 3077F SYST BP >= 140 MM HG: CPT | Mod: S$GLB,,, | Performed by: INTERNAL MEDICINE

## 2018-03-07 PROCEDURE — 3080F DIAST BP >= 90 MM HG: CPT | Mod: S$GLB,,, | Performed by: INTERNAL MEDICINE

## 2018-03-07 PROCEDURE — 99214 OFFICE O/P EST MOD 30 MIN: CPT | Mod: S$GLB,,, | Performed by: INTERNAL MEDICINE

## 2018-03-07 PROCEDURE — 99999 PR PBB SHADOW E&M-EST. PATIENT-LVL III: CPT | Mod: PBBFAC,,, | Performed by: INTERNAL MEDICINE

## 2018-03-07 NOTE — PROGRESS NOTES
CARDIOVASCULAR PROGRESS NOTE    REASON FOR CONSULT:   Rach Davalos is a 47 y.o. female who presents for follow up of CAD/NSTEMI.    PCP: Mustapha  HISTORY OF PRESENT ILLNESS:   The patient returns for follow-up.  She's had no intercurrent angina or dyspnea.  She denies palpitations, lightheadedness, dizziness, or syncope.  There's been no PND, orthopnea, or lower extremity edema.  She denies melena, hematuria, or claudicant symptoms.  She appears to be tolerating her dual antiplatelet therapy without failure.  The patient does tell me that she had fried food over the past 2 days, and is only been taking her metoprolol once daily.  Her blood pressures uncontrolled, and her weight is generally stable although her BMI was noted to go up due to a recalibration of her height.    CARDIOVASCULAR HISTORY:   CAD 17 NSTEMI, cath with RPDA ?spont dissection, med rx planned, EF normal    PAST MEDICAL HISTORY:     Past Medical History:   Diagnosis Date    Acute coronary syndrome 2017    ?spont RPDA coronary dissection, not PCI target, EF normal    Hyperlipidemia        PAST SURGICAL HISTORY:     Past Surgical History:   Procedure Laterality Date    CARDIAC CATHETERIZATION       SECTION      4 kids. All c-sections       ALLERGIES AND MEDICATION:   Review of patient's allergies indicates:  No Known Allergies  Previous Medications    ASPIRIN (ECOTRIN) 81 MG EC TABLET    Take 1 tablet (81 mg total) by mouth once daily.    ATORVASTATIN (LIPITOR) 80 MG TABLET    Take 1 tablet (80 mg total) by mouth once daily.    CLOPIDOGREL (PLAVIX) 75 MG TABLET    Take 1 tablet (75 mg total) by mouth once daily.    METOPROLOL TARTRATE (LOPRESSOR) 50 MG TABLET    Take 1 tablet (50 mg total) by mouth 2 (two) times daily.    NITROGLYCERIN (NITROSTAT) 0.4 MG SL TABLET    Place 1 tablet (0.4 mg total) under the tongue every 5 (five) minutes as needed for Chest pain.       SOCIAL HISTORY:     Social History     Social History     "Marital status:      Spouse name: N/A    Number of children: N/A    Years of education: N/A     Occupational History    Not on file.     Social History Main Topics    Smoking status: Never Smoker    Smokeless tobacco: Never Used    Alcohol use No    Drug use: No    Sexual activity: Yes     Partners: Male     Birth control/ protection: None     Other Topics Concern    Not on file     Social History Narrative    No narrative on file       FAMILY HISTORY:     Family History   Problem Relation Age of Onset    Hypertension Mother     Heart attack Father        REVIEW OF SYSTEMS:   Review of Systems   Constitutional: Negative for chills, diaphoresis and fever.   HENT: Negative for nosebleeds.    Eyes: Negative for blurred vision, double vision and photophobia.   Respiratory: Negative for hemoptysis, shortness of breath and wheezing.    Cardiovascular: Negative for chest pain, palpitations, orthopnea, claudication, leg swelling and PND.   Gastrointestinal: Negative for abdominal pain, blood in stool, heartburn, melena, nausea and vomiting.   Genitourinary: Negative for flank pain and hematuria.   Musculoskeletal: Negative for falls, myalgias and neck pain.   Skin: Negative for rash.   Neurological: Negative for dizziness, seizures, loss of consciousness, weakness and headaches.   Endo/Heme/Allergies: Negative for polydipsia. Does not bruise/bleed easily.   Psychiatric/Behavioral: Negative for depression and memory loss. The patient is not nervous/anxious.        PHYSICAL EXAM:     Vitals:    03/07/18 1425   BP: (!) 147/91   Pulse: 65   Resp: 15    Body mass index is 48.13 kg/m².  Weight: 127.2 kg (280 lb 6.8 oz)   Height: 5' 4" (162.6 cm)     Physical Exam   Constitutional: She is oriented to person, place, and time. She appears well-developed and well-nourished. She is cooperative.  Non-toxic appearance. No distress.   HENT:   Head: Normocephalic and atraumatic.   Eyes: Conjunctivae and EOM are normal. " Pupils are equal, round, and reactive to light. No scleral icterus.   Neck: Trachea normal. Neck supple. Normal carotid pulses and no JVD present. Carotid bruit is not present. No neck rigidity. No edema present. No thyromegaly present.   Cardiovascular: Normal rate, regular rhythm, S1 normal and S2 normal.  PMI is not displaced.  Exam reveals no gallop and no friction rub.    No murmur heard.  Pulses:       Carotid pulses are 2+ on the right side, and 2+ on the left side.  Pulmonary/Chest: Effort normal and breath sounds normal. No respiratory distress. She has no wheezes. She has no rales. She exhibits no tenderness.   Abdominal: Soft. Bowel sounds are normal. She exhibits no distension. There is no hepatosplenomegaly.   obese   Musculoskeletal: She exhibits no edema or tenderness.   Feet:   Right Foot:   Skin Integrity: Negative for ulcer.   Left Foot:   Skin Integrity: Negative for ulcer.   Neurological: She is alert and oriented to person, place, and time. No cranial nerve deficit.   Skin: Skin is warm and dry. No rash noted. No erythema.   Psychiatric: She has a normal mood and affect. Her speech is normal and behavior is normal.   Vitals reviewed.      DATA:   EKG: (personally reviewed tracing)  11/29/17 NSR    Laboratory:  CBC:    Recent Labs  Lab 11/20/17  1118 12/01/17  0632   WHITE BLOOD CELL COUNT 6.09 8.86   HEMOGLOBIN 12.9 13.1   HEMATOCRIT 40.0 40.1   PLATELETS 263 257       CHEMISTRIES:    Recent Labs  Lab 11/20/17  1118 11/30/17  0553 12/01/17  0632   GLUCOSE 100 127 H 112 H  112 H   SODIUM 139 136 138  138   POTASSIUM 4.0 4.5 4.3  4.3   BUN BLD 9 9 9  9   CREATININE 0.8 0.8 0.8  0.8   EGFR IF AFRICAN AMERICAN >60 >60 >60  >60   EGFR IF NON- >60 >60 >60  >60   CALCIUM 8.5 L 9.0 9.0  9.0   MAGNESIUM  --  2.0 1.9       CARDIAC BIOMARKERS:    Recent Labs  Lab 11/29/17  2345 11/30/17  0553 11/30/17  1627   TROPONIN I 2.011 H 3.604 H 3.611 H       COAGS:    Recent Labs  Lab  12/15/17  0911   INR 1.0       LIPIDS/LFTS:    Recent Labs  Lab 11/20/17  1118 11/30/17  0553 12/01/17  0632 03/01/18  0702   CHOLESTEROL 194 210 H  --  99 L   TRIGLYCERIDES 65 78  --  57   HDL 56 60  --  42   LDL CHOLESTEROL 125.0 134.4  --  45.6 L   NON-HDL CHOLESTEROL 138 150  --  57   AST 12 35 24 57 H   ALT 16 19 18 109 H       Cardiovascular Testing:  Cath 11/30/17  LVEDP: 11mmHg  LVEF: 55% by echo  Wall Motion: normal by echo  Dominance: Right  LM: normal  LAD: MLI  LCx: MLI  RCA: MLI              RPDA: ?dissection throughout, T2-3 flow, small caliber/tortuous, not PCI target.  Hemostasis:  R Radial band  Impression:  NSTEMI  1V CAD, normal LV fxn  RPDA culprit (?spontaneous dissection), not PCI target  Plan:  Med rx  Cont ASA/Plavix/BBl/Statin  Home in am  Follow up with Dr. Dye 1 week    Echo: 11/30/17    1 - Normal left ventricular systolic function (EF 55-60%).     2 - No wall motion abnormalities.     3 - Concentric hypertrophy.     4 - Mild mitral regurgitation.     5 - Trivial to mild tricuspid regurgitation.     6 - The estimated PA systolic pressure is 32 mmHg.     ASSESSMENT:   # CAD/NSTEMI/?spont RPDA dissection 11/2017  # HTN, uncontrolled in setting of med noncompliance  # HLP, on atorva 80mg.  Elevated LFTs <3xULN  # BMI 48, up 4 units vs last OV    PLAN:   Cont med rx  Resume BID metoprolol  Plavix thru 11/2018  Diet/exercise/weight loss  RTC 1 month      Bandar Dye MD, FACC

## 2018-10-25 ENCOUNTER — TELEPHONE (OUTPATIENT)
Dept: FAMILY MEDICINE | Facility: CLINIC | Age: 48
End: 2018-10-25

## 2018-10-25 NOTE — TELEPHONE ENCOUNTER
Pt unable to speak due to being at work, states she will call back and schedule pap smear and mammogram

## 2019-03-25 RX ORDER — METOPROLOL TARTRATE 50 MG/1
TABLET ORAL
Qty: 180 TABLET | Refills: 0 | Status: SHIPPED | OUTPATIENT
Start: 2019-03-25

## 2019-03-25 RX ORDER — CLOPIDOGREL BISULFATE 75 MG/1
TABLET ORAL
Qty: 90 TABLET | Refills: 3 | OUTPATIENT
Start: 2019-03-25

## 2019-03-25 RX ORDER — ATORVASTATIN CALCIUM 80 MG/1
TABLET, FILM COATED ORAL
Qty: 90 TABLET | Refills: 0 | Status: SHIPPED | OUTPATIENT
Start: 2019-03-25

## 2020-03-27 ENCOUNTER — TELEPHONE (OUTPATIENT)
Dept: CARDIOLOGY | Facility: CLINIC | Age: 50
End: 2020-03-27

## 2020-12-12 ENCOUNTER — HOSPITAL ENCOUNTER (EMERGENCY)
Facility: HOSPITAL | Age: 50
Discharge: HOME | End: 2020-12-12
Attending: EMERGENCY MEDICINE
Payer: COMMERCIAL

## 2020-12-12 VITALS
WEIGHT: 280 LBS | SYSTOLIC BLOOD PRESSURE: 192 MMHG | DIASTOLIC BLOOD PRESSURE: 125 MMHG | OXYGEN SATURATION: 99 % | RESPIRATION RATE: 18 BRPM | BODY MASS INDEX: 48.06 KG/M2 | TEMPERATURE: 99 F | HEART RATE: 104 BPM
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
## 2020-12-12 DIAGNOSIS — M62.838 MUSCLE SPASM: ICD-10-CM

## 2020-12-12 DIAGNOSIS — I10 HYPERTENSION, UNSPECIFIED TYPE: ICD-10-CM

## 2020-12-12 LAB
B-HCG UR QL: NEGATIVE
CTP QC/QA: YES

## 2020-12-12 PROCEDURE — 81025 URINE PREGNANCY TEST: CPT | Mod: ER | Performed by: EMERGENCY MEDICINE

## 2020-12-12 PROCEDURE — 99284 EMERGENCY DEPT VISIT MOD MDM: CPT | Mod: ER

## 2020-12-12 RX ORDER — BACLOFEN 10 MG/1
10 TABLET ORAL 3 TIMES DAILY
Qty: 30 TABLET | Refills: 0 | Status: SHIPPED | OUTPATIENT
Start: 2020-12-12 | End: 2021-12-12

## 2020-12-12 RX ORDER — FAMOTIDINE 20 MG/1
20 TABLET, FILM COATED ORAL 2 TIMES DAILY
Qty: 60 TABLET | Refills: 0 | OUTPATIENT
Start: 2020-12-12 | End: 2023-02-08

## 2020-12-12 RX ORDER — MELOXICAM 15 MG/1
15 TABLET ORAL DAILY
Qty: 30 TABLET | Refills: 0 | Status: SHIPPED | OUTPATIENT
Start: 2020-12-12

## 2020-12-12 NOTE — DISCHARGE INSTRUCTIONS
Thank you for coming to our Emergency Department today. It is important to remember that some problems are difficult to diagnose and may not be found during your first visit. Be sure to follow up with your primary care doctor and review any labs/imaging that was performed with them. If you do not have a primary care doctor, you may contact the one listed on your discharge paperwork or you may also call the [facility] Appointment Desk at [phone] to schedule an appointment with one.     All medications may potentially have side effects and it is impossible to predict which medications may give you side effects. If you feel that you are having a negative effect of any medication you should immediately stop taking them and seek medical attention.    Return to the ER with any questions/concerns, new/concerning symptoms, worsening or failure to improve. Do not drive or make any important decisions for 24 hours if you have received any pain medications, sedatives or mood altering drugs during your ER visit.
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
## 2021-01-17 ENCOUNTER — CLINICAL SUPPORT (OUTPATIENT)
Dept: URGENT CARE | Facility: CLINIC | Age: 51
End: 2021-01-17
Payer: COMMERCIAL

## 2021-01-17 DIAGNOSIS — Z11.59 SCREENING FOR VIRAL DISEASE: Primary | ICD-10-CM

## 2021-01-17 DIAGNOSIS — U07.1 COVID-19 VIRUS DETECTED: ICD-10-CM

## 2021-01-17 LAB
CTP QC/QA: YES
SARS-COV-2 RDRP RESP QL NAA+PROBE: POSITIVE

## 2021-01-17 PROCEDURE — U0002: ICD-10-PCS | Mod: QW,S$GLB,, | Performed by: NURSE PRACTITIONER

## 2021-01-17 PROCEDURE — U0002 COVID-19 LAB TEST NON-CDC: HCPCS | Mod: QW,S$GLB,, | Performed by: NURSE PRACTITIONER

## 2023-02-08 ENCOUNTER — HOSPITAL ENCOUNTER (EMERGENCY)
Facility: HOSPITAL | Age: 53
Discharge: HOME | End: 2023-02-08
Attending: EMERGENCY MEDICINE
Payer: COMMERCIAL

## 2023-02-08 VITALS
TEMPERATURE: 98 F | RESPIRATION RATE: 20 BRPM | HEART RATE: 88 BPM | SYSTOLIC BLOOD PRESSURE: 156 MMHG | DIASTOLIC BLOOD PRESSURE: 94 MMHG | BODY MASS INDEX: 46.1 KG/M2 | WEIGHT: 270 LBS | HEIGHT: 64 IN | OXYGEN SATURATION: 98 %

## 2023-02-08 DIAGNOSIS — J10.1 INFLUENZA A: Primary | ICD-10-CM

## 2023-02-08 LAB
CTP QC/QA: YES
INFLUENZA A ANTIGEN, POC: POSITIVE
INFLUENZA B ANTIGEN, POC: NEGATIVE
POC RAPID STREP A: NEGATIVE
SARS-COV-2 RDRP RESP QL NAA+PROBE: NEGATIVE

## 2023-02-08 PROCEDURE — 99284 EMERGENCY DEPT VISIT MOD MDM: CPT | Mod: ER

## 2023-02-08 PROCEDURE — 87635 SARS-COV-2 COVID-19 AMP PRB: CPT | Mod: ER | Performed by: NURSE PRACTITIONER

## 2023-02-08 PROCEDURE — 87804 INFLUENZA ASSAY W/OPTIC: CPT | Mod: 59,ER

## 2023-02-08 RX ORDER — GUAIFENESIN 100 MG/5ML
100-200 SOLUTION ORAL EVERY 4 HOURS PRN
Qty: 60 ML | Refills: 0 | Status: SHIPPED | OUTPATIENT
Start: 2023-02-08 | End: 2023-02-18

## 2023-02-08 RX ORDER — IBUPROFEN 600 MG/1
600 TABLET ORAL EVERY 6 HOURS PRN
Qty: 20 TABLET | Refills: 0 | Status: SHIPPED | OUTPATIENT
Start: 2023-02-08

## 2023-02-08 RX ORDER — ONDANSETRON 8 MG/1
8 TABLET, ORALLY DISINTEGRATING ORAL EVERY 6 HOURS PRN
Qty: 30 TABLET | Refills: 0 | Status: SHIPPED | OUTPATIENT
Start: 2023-02-08

## 2023-02-08 RX ORDER — CETIRIZINE HYDROCHLORIDE 10 MG/1
10 TABLET ORAL DAILY
Qty: 30 TABLET | Refills: 0 | Status: SHIPPED | OUTPATIENT
Start: 2023-02-08 | End: 2024-02-08

## 2023-02-08 RX ORDER — FAMOTIDINE 20 MG/1
20 TABLET, FILM COATED ORAL 2 TIMES DAILY
Qty: 60 TABLET | Refills: 0 | Status: SHIPPED | OUTPATIENT
Start: 2023-02-08 | End: 2024-02-08

## 2023-02-08 RX ORDER — BENZONATATE 100 MG/1
100 CAPSULE ORAL 3 TIMES DAILY PRN
Qty: 30 CAPSULE | Refills: 0 | Status: SHIPPED | OUTPATIENT
Start: 2023-02-08 | End: 2023-02-18

## 2023-02-08 NOTE — Clinical Note
"Rach"Hay Davalos was seen and treated in our emergency department on 2/8/2023.     COVID-19 is present in our communities across the state. There is limited testing for COVID at this time, so not all patients can be tested. In this situation, your employee meets the following criteria:    Rach Davalos has met the criteria for COVID-19 testing and has a NEGATIVE result. The employee can return to work once they are asymptomatic for 24 hours without the use of fever reducing medications (Tylenol, Motrin, etc).     If the employee is not fully vaccinated and had a close contact:  · Retest at 5 to 7 days post-exposure  · If possible, it is recommended that they quarantine for 5 days from the time of contact regardless of their test status.  · A mask should be worn post quarantine for 5 days.    If you have any questions or concerns, or if I can be of further assistance, please do not hesitate to contact me.    Sincerely,             Nohelia Parekh MD"

## 2023-02-08 NOTE — DISCHARGE INSTRUCTIONS
Thank you for coming to our Emergency Department today. It is important to remember that some problems or medical conditions are difficult to diagnose and may not be found or addressed during your Emergency Department visit.     Be sure to follow up with your primary care doctor and review all labs/imaging/tests that were performed during your ER visit with them. Some labs/imaging/tests may be outside of the normal range, and require non-emergent follow-up and/or further investigation/treatment/procedures/testing to help diagnose/exclude/prevent complications or other potentially serious medical conditions that were not discussed or addressed during your ER visit.    If you do not have a primary care doctor, you may contact the one listed on your discharge paperwork or you may also call the [facility] Appointment Desk at [phone] to schedule an appointment and establish care with one. It is important to your health that you have a primary care doctor.    Please take all medications as directed. All medications may potentially have side-effects and it is impossible to predict which medications may give you side-effects or what side-effects (if any) they will give you.. If you feel that you are having a negative effect or side-effect of any medication you should immediately stop taking them and seek medical attention. If you feel that you are having a life-threatening reaction call 911.    Return to the ER with any questions/concerns, new/concerning symptoms, worsening or failure to improve.     Do not drive, swim, climb to height, take a bath, operate heavy machinery, drink alcohol or take potentially sedating medications, sign any legal documents or make any important decisions for 24 hours if you have received any pain medications, sedatives or mood altering drugs during your ER visit or within 24 hours of taking them if they have been prescribed to you.     You can find additional resources for Dentists,  hearing aids, durable medical equipment, low cost pharmacies and other resources at [email or web address]    BELOW THIS LINE ONLY APPLIES IF YOU HAVE A COVID TEST PENDING OR IF YOU HAVE BEEN DIAGNOSED WITH COVID:  Please access [facility] to review the results of your test. Until the results of your COVID test return, you should isolate yourself so as not to potentially spread illness to others.   If your COVID test returns positive, you should isolate yourself so as not to spread illness to others. After five full days, if you are feeling better and you have not had fever for 24 hours, you can return to your typical daily activities, but you must wear a mask around others for an additional 5 days.   If your COVID test returns negative and you are either unvaccinated or more than six months out from your two-dose vaccine and are not yet boosted, you should still quarantine for 5 full days followed by strict mask use for an additional 5 full days.   If your COVID test returns negative and you have received your 2-dose initial vaccine as well as a booster, you should continue strict mask use for 10 full days after the exposure.  For all those exposed, best practice includes a test at day 5 after the exposure. This can be a home test or a test through one of the many testing centers throughout our community.   Masking is always advised to limit the spread of COVID. Cdc.gov is an excellent site to obtain the latest up to date recommendations regarding COVID and COVID testing.     CDC Testing and Quarantine Guidelines for patients with exposure to a known-positive COVID-19 person:  A close exposure is defined as anyone who has had an exposure (masked or unmasked) to a known COVID -19 positive person within 6 feet of someone for a cumulative total of 15 minutes or more over a 24-hour period.   Vaccinated and/or if you recently had a positive covid test within 90 days do NOT need to quarantine after contact with someone  who had COVID-19 unless you develop symptoms.   Fully vaccinated people who have not had a positive test within 90 days, should get tested 3-5 days after their exposure, even if they don't have symptoms and wear a mask indoors in public for 14 days following exposure or until their test result is negative.      Unvaccinated and/or NOT had a positive test within 90 days and meet close exposure  You are required by CDC guidelines to quarantine for at least 5 days from time of exposure followed by 5 days of strict masking. It is recommended, but not required to test after 5 days, unless you develop symptoms, in which case you should test at that time.  If you get tested after 5 days and your test is positive, your 5 day period of isolation starts the day of the positive test.    If your exposure does not meet the above definition, you can return to your normal daily activities to include social distancing, wearing a mask and frequent handwashing.      Here is a link to guidance from the CDC:  https://www.cdc.gov/media/releases/2021/s1227-isolation-quarantine-guidance.html      [facility] Testing Sites:  https://[place]h.[place].gov/page/3934      [facility] website with testing locations and guidance:  [email or web address]

## 2023-02-08 NOTE — Clinical Note
"Rach Ac" Anoop was seen and treated in our emergency department on 2/8/2023.  She may return to work on 02/13/2023.       If you have any questions or concerns, please don't hesitate to call.       RN    "

## 2023-02-08 NOTE — ED PROVIDER NOTES
Encounter Date: 2023       History     Chief Complaint   Patient presents with    URI     Pt states cough sore throat and body aches x3 days      52 y.o. 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 Denies f/c, n/v, diarrhea/dysuria or other complaints.    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 All c-sections     Family History   Problem Relation Age of Onset    Hypertension Mother     Heart attack Father      Social History     Tobacco Use    Smoking status: Never    Smokeless tobacco: Never   Substance Use Topics    Alcohol use: No    Drug use: No     Review of Systems   Constitutional:  Negative for fever.   HENT:  Positive for sore throat.    Respiratory:  Negative for shortness of breath.    Cardiovascular:  Negative for chest pain.   Gastrointestinal:  Negative for nausea.   Genitourinary:  Negative for dysuria.   Musculoskeletal:  Negative for back pain.   Skin:  Negative for rash.   Neurological:  Negative for weakness.   Hematological:  Does not bruise/bleed easily.   All other systems reviewed and are negative.    Physical Exam     Initial Vitals   BP Pulse Resp Temp SpO2   23 1115 23 1115 23 1115 23 1116 23 1115   (!) 156/90 88 20 98.4 °F (36.9 °C) 98 %      MAP       --                Physical Exam    Nursing note and vitals reviewed.  Constitutional: She appears well-developed and well-nourished.   HENT:   Head: Normocephalic and atraumatic.   Eyes: Conjunctivae and EOM are normal. Pupils  are equal, round, and reactive to light.   Neck:   Normal range of motion.  Cardiovascular:  Normal rate and regular rhythm.           Pulmonary/Chest: She is in respiratory distress.   Abdominal: She exhibits no distension.   Musculoskeletal:         General: Normal range of motion.      Cervical back: Normal range of motion.     Neurological: She is alert. No cranial nerve deficit. GCS score is 15. GCS eye subscore is 4. GCS verbal subscore is 5. GCS motor subscore is 6.   Skin: Skin is warm and dry.   Psychiatric: She has a normal mood and affect. Thought content normal.       ED Course   Procedures                Medications - No data to display       MEDICAL DECISION MAKING    After review of the patient's physical exam, ED testing, and history/symptoms, relevant labs, imaging, available outside records  a wide differential was considered including but not limited to: infectious, traumatic, vascular, toxicological , malignant, ischemic, embolic, psychological, genetic, iatrogenic, idiopathic, substance dependence/intoxication/withdrawal, electrolyte or blood dyscrasia, and other etiologies.        labs/imaging/interventions include:       Medications - No data to display  Labs Reviewed   POCT RAPID INFLUENZA A/B - Abnormal; Notable for the following components:       Result Value    Inflenza A Ag positive (*)     All other components within normal limits   SARS-COV-2 RDRP GENE    Narrative:     This test utilizes isothermal nucleic acid amplification technology to detect the SARS-CoV-2 RdRp nucleic acid segment. The analytical sensitivity (limit of detection) is 500 copies/swab.     A POSITIVE result is indicative of the presence of SARS-CoV-2 RNA; clinical correlation with patient history and other diagnostic information is necessary to determine patient infection status.    A NEGATIVE result means that SARS-CoV-2 nucleic acids are not present above the limit of detection. A NEGATIVE result should be treated  as presumptive. It does not rule out the possibility of COVID-19 and should not be the sole basis for treatment decisions. If COVID-19 is strongly suspected based on clinical and exposure history, re-testing using an alternate molecular assay should be considered.     This test is only for use under the Food and Drug Administration s Emergency Use Authorization (EUA).     Commercial kits are provided by [employer]. Performance characteristics of the EUA have been independently verified by [facility] Department of Pathology and Laboratory Medicine.   _________________________________________________________________   The authorized Fact Sheet for Healthcare Providers and the authorized Fact Sheet for Patients of the ID NOW COVID-19 are available on the FDA website:    https://www.fda.gov/media/768652/download      https://www.fda.gov/media/488638/download      POCT INFLUENZA A/B MOLECULAR   POCT STREP A MOLECULAR   POCT STREP A, RAPID      No orders to display     Will treat with supportive care and encourage quarantine.    I have independently evaluated and interpreted all available labs and imaging.  The suspected diagnosis, treatment and plan were discussed with the patient. All questions or concerns have been addressed.    Note was created using voice recognition software. Note may have occasional typographical or grammatical errors , garbled syntax, and other bizarre constructions that may not have been identified and edited despite good merritt initial review prior to signing.                             Clinical Impression:   Final diagnoses:  [J10.1] Influenza A (Primary)               Nohelia Parekh MD  02/08/23 1222

## 2023-03-26 ENCOUNTER — HOSPITAL ENCOUNTER (EMERGENCY)
Facility: HOSPITAL | Age: 53
Discharge: HOME | End: 2023-03-26
Attending: EMERGENCY MEDICINE
Payer: COMMERCIAL

## 2023-03-26 VITALS
BODY MASS INDEX: 47.72 KG/M2 | TEMPERATURE: 99 F | RESPIRATION RATE: 18 BRPM | WEIGHT: 278 LBS | HEART RATE: 108 BPM | SYSTOLIC BLOOD PRESSURE: 168 MMHG | DIASTOLIC BLOOD PRESSURE: 99 MMHG | OXYGEN SATURATION: 99 %

## 2023-03-26 DIAGNOSIS — J02.0 STREP PHARYNGITIS: Primary | ICD-10-CM

## 2023-03-26 LAB — POC RAPID STREP A: POSITIVE

## 2023-03-26 PROCEDURE — 99284 EMERGENCY DEPT VISIT MOD MDM: CPT | Mod: ER

## 2023-03-26 RX ORDER — PREDNISONE 20 MG/1
40 TABLET ORAL DAILY
Qty: 10 TABLET | Refills: 0 | Status: SHIPPED | OUTPATIENT
Start: 2023-03-26 | End: 2023-03-31

## 2023-03-26 RX ORDER — AMOXICILLIN 500 MG/1
500 CAPSULE ORAL 2 TIMES DAILY
Qty: 20 CAPSULE | Refills: 0 | Status: SHIPPED | OUTPATIENT
Start: 2023-03-26 | End: 2023-04-05

## 2023-03-26 NOTE — DISCHARGE INSTRUCTIONS
Thank you for coming to our Emergency Department today. It is important to remember that some problems or medical conditions are difficult to diagnose and may not be found during your Emergency Department visit.     Be sure to follow up with your primary care doctor and review all labs/imaging/tests that were performed during your ER visit with them. Some labs/tests may be outside of the normal range and require non-emergent follow-up and further investigation to help diagnose/exclude/prevent complications or other potentially serious medical conditions that were not addressed during your ER visit.    If you do not have a primary care doctor, you may contact the one listed on your discharge paperwork or you may also call the [facility] Appointment Desk at [phone] to schedule an appointment and establish care with one. It is important to your health that you have a primary care doctor.    Please take all medications as directed. All medications may potentially have side-effects and it is impossible to predict which medications may give you side-effects or what side-effects (if any) they will give you.. If you feel that you are having a negative effect or side-effect of any medication you should immediately stop taking them and seek medical attention. If you feel that you are having a life-threatening reaction call 911.    Return to the ER with any questions/concerns, new/concerning symptoms, worsening or failure to improve.     Do not drive, swim, climb to height, take a bath, operate heavy machinery, drink alcohol or take potentially sedating medications, sign any legal documents or make any important decisions for 24 hours if you have received any pain medications, sedatives or mood altering drugs during your ER visit or within 24 hours of taking them if they have been prescribed to you.     You can find additional resources for Dentists, hearing aids, durable medical equipment, low cost pharmacies and  other resources at [email or web address]    BELOW THIS LINE ONLY APPLIES IF YOU HAVE A COVID TEST PENDING OR IF YOU HAVE BEEN DIAGNOSED WITH COVID:  Please access [facility] to review the results of your test. Until the results of your COVID test return, you should isolate yourself so as not to potentially spread illness to others.   If your COVID test returns positive, you should isolate yourself so as not to spread illness to others. After five full days, if you are feeling better and you have not had fever for 24 hours, you can return to your typical daily activities, but you must wear a mask around others for an additional 5 days.   If your COVID test returns negative and you are either unvaccinated or more than six months out from your two-dose vaccine and are not yet boosted, you should still quarantine for 5 full days followed by strict mask use for an additional 5 full days.   If your COVID test returns negative and you have received your 2-dose initial vaccine as well as a booster, you should continue strict mask use for 10 full days after the exposure.  For all those exposed, best practice includes a test at day 5 after the exposure. This can be a home test or a test through one of the many testing centers throughout our community.   Masking is always advised to limit the spread of COVID. Cdc.gov is an excellent site to obtain the latest up to date recommendations regarding COVID and COVID testing.     CDC Testing and Quarantine Guidelines for patients with exposure to a known-positive COVID-19 person:  A close exposure is defined as anyone who has had an exposure (masked or unmasked) to a known COVID -19 positive person within 6 feet of someone for a cumulative total of 15 minutes or more over a 24-hour period.   Vaccinated and/or if you recently had a positive covid test within 90 days do NOT need to quarantine after contact with someone who had COVID-19 unless you develop symptoms.   Fully vaccinated  people who have not had a positive test within 90 days, should get tested 3-5 days after their exposure, even if they don't have symptoms and wear a mask indoors in public for 14 days following exposure or until their test result is negative.      Unvaccinated and/or NOT had a positive test within 90 days and meet close exposure  You are required by CDC guidelines to quarantine for at least 5 days from time of exposure followed by 5 days of strict masking. It is recommended, but not required to test after 5 days, unless you develop symptoms, in which case you should test at that time.  If you get tested after 5 days and your test is positive, your 5 day period of isolation starts the day of the positive test.    If your exposure does not meet the above definition, you can return to your normal daily activities to include social distancing, wearing a mask and frequent handwashing.      Here is a link to guidance from the CDC:  https://www.cdc.gov/media/releases/2021/s1227-isolation-quarantine-guidance.html      [place] [facility] Testing Sites:  https://ldh.[place].gov/page/3934      [facility] website with testing locations and guidance:  [email or web address]

## 2023-03-26 NOTE — ED PROVIDER NOTES
Encounter Date: 3/26/2023    SCRIBE #1 NOTE: I, Gonzalo Malhotra, am scribing for, and in the presence of,  Destiny Haynes NP. I have scribed the following portions of the note - Other sections scribed: HPI, ROS.     History     Chief Complaint   Patient presents with    Sore Throat     Sore throat since Friday. Mild relief with OTC medications. + bilateral exudate     CC: Sore throat    HPI: Rach Davalos is a 52 y.o. female who presents to the ED for chief complaint of sore throat onset a week ago. She denies nausea, vomiting, diarrhea, or other associated symptoms. She reports her sore throat has worsened in the past 3 days. She notes minor relief from Advil but denies taking any medications for her symptoms today. Pt denies recent use of antibiotics. 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 She denies having any pertinent PMHx or any known allergies.    The history is provided by the patient. No  was used.   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 All c-sections     Family History   Problem Relation Age of Onset    Hypertension Mother     Heart attack Father      Social History     Tobacco Use    Smoking status: Never    Smokeless tobacco: Never   Substance Use Topics    Alcohol use: No    Drug use: No     Review of Systems   Constitutional:  Negative for fever.   HENT:  Positive for sore throat.    Eyes:  Negative for visual disturbance.   Respiratory:  Negative for shortness of breath.    Cardiovascular:  Negative for chest pain.   Gastrointestinal:  Negative for abdominal pain, diarrhea, nausea and vomiting.   Genitourinary:  Negative for dysuria.    Musculoskeletal:  Negative for back pain.   Skin:  Negative for rash.   Neurological:  Negative for headaches.     Physical Exam     Initial Vitals [03/26/23 0956]   BP Pulse Resp Temp SpO2   (!) 168/99 108 18 98.9 °F (37.2 °C) 99 %      MAP       --         Physical Exam    Constitutional: She appears well-developed and well-nourished. She is not diaphoretic. No distress.   HENT:   Head: Normocephalic and atraumatic.   Mouth/Throat: Oropharyngeal exudate and posterior oropharyngeal erythema present.   Neck:   Normal range of motion.  Cardiovascular:  Normal rate, regular rhythm, normal heart sounds and intact distal pulses.           Pulmonary/Chest: No respiratory distress.   Musculoskeletal:         General: Normal range of motion.      Cervical back: Normal range of motion.     Neurological: She is alert and oriented to person, place, and time.   Skin: Skin is warm and dry.   Psychiatric: She has a normal mood and affect. Her behavior is normal.       ED Course   Procedures  Labs Reviewed   POCT STREP A, RAPID - Abnormal; Notable for the following components:       Result Value    POC Rapid Strep A positive (*)     All other components within normal limits   POCT STREP A MOLECULAR          Imaging Results    None          Medications - No data to display  Medical Decision Making:   History:   Old Medical Records: I decided to obtain old medical records.  Clinical Tests:   Lab Tests: Ordered and Reviewed  ED Management:  This is an evaluation of a 52 y.o. female that presents to the Emergency Department for sore throat.  The patient is a non-toxic, afebrile, and well appearing female. On physical exam, there is tonsillar exudates, erythema; she has no trismus, uvula deviation, drooling, stridor, or respiratory distress. No sign of PTA. There is cervical lymphadenopathy. Neck is soft and supple with no meningeal signs. Breath sounds clear and equal bilaterally.     Vital Signs Are Reassuring.   Rapid Strep Test:  Positive    Patient declined Bicillin and Decadron injection the ED.  She would like to be treated with oral medications.    Given the above findings, my overall impression is strep pharyngitis. Given the above findings, I do not think the patient has OM, OE, peritonsillar abscess, retropharyngeal abscess, epiglotitis, meningitis, or airway compromise.    The patient will be discharged home with amoxicillin and prednisone. Home care: OTC medications for symptomatic relief, sore throat self care DC instructions. The diagnosis, treatment plan, instructions for follow-up and reevaluation with Primary Care as well as ED return precautions have been discussed with the patient and understanding of the information was verbalized. All questions or concerns from the patient have been addressed.         Scribe Attestation:   Scribe #1: I performed the above scribed service and the documentation accurately describes the services I performed. I attest to the accuracy of the note.                  Scribe attestation: I, KAY Haynes, personally performed the services described in this documentation. All medical record entries made by the scribe were at my direction and in my presence.  I have reviewed the chart and agree that the record reflects my personal performance and is accurate and complete.   Clinical Impression:   Final diagnoses:  [J02.0] Strep pharyngitis (Primary)        ED Disposition Condition    Discharge Stable          ED Prescriptions       Medication Sig Dispense Start Date End Date Auth. Provider    amoxicillin (AMOXIL) 500 MG capsule Take 1 capsule (500 mg total) by mouth 2 (two) times a day. for 10 days 20 capsule 3/26/2023 4/5/2023 Destiny Haynes NP    predniSONE (DELTASONE) 20 MG tablet Take 2 tablets (40 mg total) by mouth once daily. for 5 days 10 tablet 3/26/2023 3/31/2023 Destiny Haynes NP          Follow-up Information       Follow up With Specialties Details Why Contact Info    [facility]  [place]  Schedule an appointment as soon as possible for a visit in 1 day For follow-up if you do not have a primary care doctor [address] [facility] [address]  [phone]      [facility] ED Emergency Medicine Go to  If symptoms worsen [address]  [facility] [address] [phone]             Destiny Haynes NP  03/26/23 1051

## 2024-06-03 ENCOUNTER — HOSPITAL ENCOUNTER (EMERGENCY)
Facility: HOSPITAL | Age: 54
Discharge: HOME | End: 2024-06-03
Attending: EMERGENCY MEDICINE
Payer: COMMERCIAL

## 2024-06-03 VITALS
HEART RATE: 81 BPM | OXYGEN SATURATION: 99 % | TEMPERATURE: 98 F | HEIGHT: 64 IN | DIASTOLIC BLOOD PRESSURE: 88 MMHG | BODY MASS INDEX: 48.65 KG/M2 | SYSTOLIC BLOOD PRESSURE: 161 MMHG | WEIGHT: 285 LBS | RESPIRATION RATE: 20 BRPM

## 2024-06-03 DIAGNOSIS — R05.9 COUGH: Primary | ICD-10-CM

## 2024-06-03 LAB
ALBUMIN SERPL-MCNC: 3.2 G/DL (ref 3.3–5.5)
ALP SERPL-CCNC: 74 U/L (ref 42–141)
BILIRUB SERPL-MCNC: 0.5 MG/DL (ref 0.2–1.6)
BUN SERPL-MCNC: 11 MG/DL (ref 7–22)
CALCIUM SERPL-MCNC: 9.1 MG/DL (ref 8–10.3)
CHLORIDE SERPL-SCNC: 104 MMOL/L (ref 98–108)
CREAT SERPL-MCNC: 0.9 MG/DL (ref 0.6–1.2)
GLUCOSE SERPL-MCNC: 89 MG/DL (ref 73–118)
HCT, POC: NORMAL
HGB, POC: NORMAL (ref 14–18)
MCH, POC: NORMAL
MCHC, POC: NORMAL
MCV, POC: NORMAL
MPV, POC: NORMAL
POC ALT (SGPT): 14 U/L (ref 10–47)
POC AST (SGOT): 18 U/L (ref 11–38)
POC B-TYPE NATRIURETIC PEPTIDE: 22.1
POC CARDIAC TROPONIN I: 0 NG/ML (ref 0–0.08)
POC PLATELET COUNT: NORMAL
POC TCO2: 26 MMOL/L (ref 18–33)
POTASSIUM BLD-SCNC: 3.9 MMOL/L (ref 3.6–5.1)
PROTEIN, POC: 7.6 G/DL (ref 6.4–8.1)
RBC, POC: NORMAL
RDW, POC: NORMAL
SAMPLE: NORMAL
SODIUM BLD-SCNC: 143 MMOL/L (ref 128–145)
WBC, POC: NORMAL

## 2024-06-03 PROCEDURE — 83880 ASSAY OF NATRIURETIC PEPTIDE: CPT | Mod: ER

## 2024-06-03 PROCEDURE — 84484 ASSAY OF TROPONIN QUANT: CPT | Mod: ER

## 2024-06-03 PROCEDURE — 80053 COMPREHEN METABOLIC PANEL: CPT | Mod: ER

## 2024-06-03 PROCEDURE — 93005 ELECTROCARDIOGRAM TRACING: CPT | Mod: ER

## 2024-06-03 PROCEDURE — 99284 EMERGENCY DEPT VISIT MOD MDM: CPT | Mod: 25,ER

## 2024-06-03 PROCEDURE — 93010 ELECTROCARDIOGRAM REPORT: CPT | Mod: ,,, | Performed by: INTERNAL MEDICINE

## 2024-06-03 PROCEDURE — 85025 COMPLETE CBC W/AUTO DIFF WBC: CPT | Mod: ER

## 2024-06-03 RX ORDER — FLUTICASONE PROPIONATE 50 MCG
1 SPRAY, SUSPENSION (ML) NASAL 2 TIMES DAILY PRN
Qty: 15 G | Refills: 0 | Status: SHIPPED | OUTPATIENT
Start: 2024-06-03

## 2024-06-03 RX ORDER — BENZONATATE 100 MG/1
100 CAPSULE ORAL 3 TIMES DAILY PRN
Qty: 20 CAPSULE | Refills: 0 | Status: SHIPPED | OUTPATIENT
Start: 2024-06-03 | End: 2024-06-13

## 2024-06-03 NOTE — DISCHARGE INSTRUCTIONS
Your workup today did not show anything concerning for acute heart failure.  Your chest xray did not show any pneumonia.  With your associated congestion and runny nose, I suspect the cough is being caused by the postnasal drip.  I am discharging you with flonase and cough suppressant.  Your blood pressure is mildly elevated here which could be due to the circumstances of being in the ED.  But, in your chart, it appears that you have a history of elevated blood pressure.  Please keep journal of your blood pressure at home to present to your PCP.  Follow up with PCP if your cough does not improve.  Return to ED with any worsening symptoms.   Thank you for allowing me to take care of you today.

## 2024-06-03 NOTE — ED PROVIDER NOTES
Encounter Date: 6/3/2024       History     Chief Complaint   Patient presents with    Cough     Productive cough with yellow sputum for over one month. Noted wheezing over the past 4 days. No distress noted in triage.      Patient was a 54-year-old female with history of hyperlipidemia, hypertension, and coronary arterial disease (although she denies medical history and does not currently take any medications) presents to the emergency department with cough.  Reports she has had a worsening cough over the last month.  Reports she has had some shortness a breath with a cough.  Reports the cough and shortness of breath is worse when she lies flat.  Reports she feels rattling in her chest and wheezing.  Denies fevers.  Denies chest pain.  Denies lower extremity swelling that is worse than her baseline.  Denies recent travel, recent surgery, or previous blood clot.  She does reports having congestion and rhinorrhea.  Reports she feels mucous in back of throat.    The history is provided by the patient.     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 All c-sections     Family History   Problem Relation Name Age of Onset    Hypertension Mother Huyen     Heart attack Father Dontrell      Social History     Tobacco Use    Smoking status: Never    Smokeless tobacco: Never   Substance Use Topics    Alcohol use: No    Drug use: No     Review of Systems   Constitutional:  Negative for activity change, appetite change, chills, fatigue and fever.   HENT:  Positive for congestion, postnasal drip and rhinorrhea. Negative for ear discharge, ear pain, sore throat and trouble swallowing.    Respiratory:  Positive for cough and shortness of breath.     Cardiovascular:  Negative for chest pain and leg swelling.   Gastrointestinal:  Negative for abdominal pain, constipation, diarrhea, nausea and vomiting.   Genitourinary:  Negative for dysuria, flank pain and hematuria.   Musculoskeletal:  Negative for back pain, neck pain and neck stiffness.   Skin:  Negative for rash and wound.   Neurological:  Negative for dizziness, light-headedness and headaches.       Physical Exam     Initial Vitals [06/03/24 1133]   BP Pulse Resp Temp SpO2   (!) 165/93 92 20 98.6 °F (37 °C) 98 %      MAP       --         Physical Exam    Nursing note and vitals reviewed.  Constitutional: She appears well-developed and well-nourished. She is not diaphoretic. She is Obese .  Non-toxic appearance. No distress.   HENT:   Head: Normocephalic.   Right Ear: Hearing, tympanic membrane, external ear and ear canal normal.   Left Ear: Hearing, tympanic membrane, external ear and ear canal normal.   Nose: Mucosal edema and rhinorrhea present. Right sinus exhibits no maxillary sinus tenderness and no frontal sinus tenderness. Left sinus exhibits no maxillary sinus tenderness and no frontal sinus tenderness.   Mouth/Throat: Uvula is midline, oropharynx is clear and moist and mucous membranes are normal. No oropharyngeal exudate.   Posterior oropharyngeal cobblestoning     Eyes: Conjunctivae and EOM are normal. Pupils are equal, round, and reactive to light.   Neck:   Normal range of motion.  Cardiovascular:  Normal rate and regular rhythm.           No pitting edema   Pulmonary/Chest: Breath sounds normal. No respiratory distress. She has no wheezes. She has no rhonchi. She has no rales. She exhibits no tenderness.   Abdominal: Abdomen is soft. Bowel sounds are normal. There is no abdominal tenderness.   Musculoskeletal:         General: Normal range of motion.      Cervical back: Normal range of motion.     Lymphadenopathy:     She has no cervical adenopathy.   Neurological: She is alert and oriented  to person, place, and time. She has normal strength.   Skin: Skin is warm and dry. Capillary refill takes less than 2 seconds.   Psychiatric: She has a normal mood and affect.         ED Course   Procedures  Labs Reviewed   TROPONIN ISTAT   POCT CBC   POCT CMP   POCT B-TYPE NATRIURETIC PEPTIDE (BNP)   POCT TROPONIN   POCT CMP     EKG Readings: (Independently Interpreted)   Initial Reading: No STEMI. Rhythm: Normal Sinus Rhythm.       Imaging Results              X-Ray Chest PA And Lateral (Final result)  Result time 06/03/24 14:05:45      Final result by Oj Tate MD (06/03/24 14:05:45)                   Impression:      1. Interstitial findings are accentuated by habitus, no large focal consolidation.      Electronically signed by: Oj Tate MD  Date:    06/03/2024  Time:    14:05               Narrative:    EXAMINATION:  XR CHEST PA AND LATERAL    CLINICAL HISTORY:  Cough, unspecified    TECHNIQUE:  PA and lateral views of the chest were performed.    COMPARISON:  11/29/2017    FINDINGS:  The cardiomediastinal silhouette is not enlarged.  There is no pleural effusion.  The trachea is midline.  The lungs are symmetrically expanded bilaterally with coarse interstitial attenuation accentuated by habitus..  No large focal consolidation seen.  There is no pneumothorax.  The osseous structures are remarkable for degenerative change..                                    X-Rays:   Independently Interpreted Readings:   Other Readings:  No acute cardiopulmonary process    Medications - No data to display  Medical Decision Making  Urgent evaluation of a 54-year-old female who presents to the emergency department with cough.  Patient was afebrile, nontoxic-appearing, and hemodynamically stable.  Slightly hypertensive.  Oxygen saturation is 98% on room air.  Normal heart and lung sounds.  No pitting lower extremity edema.  Nasal mucosal edema.  Posterior oropharyngeal cobblestoning.  Cough is likely caused by  postnasal drip.  With the patient reporting her cough is worse at night when she lies down, I will obtain cardiac workup to ensure no acute CHF.    2:46 PM  Labs reveal no abnormality.  Specifically no significant anemia, kidney insufficiency, or electrolyte disturbance.  Troponin and BNP are normal.  EKG shows no acute ischemia.  Chest x-ray shows no pulmonary edema, pneumonia, or significant cardiomegaly.  Patient will be discharged with Flonase and Tessalon.  She was encouraged to follow up with PCP.  She was also advised to keep blood pressure journal to present to her PCP.  She was advised to return to the emergency department with any worsening symptoms or concerns.    Amount and/or Complexity of Data Reviewed  Labs: ordered.  Radiology: ordered.                                      Clinical Impression:  Final diagnoses:  [R05.9] Cough (Primary)          ED Disposition Condition    Discharge Stable          ED Prescriptions       Medication Sig Dispense Start Date End Date Auth. Provider    fluticasone propionate (FLONASE) 50 mcg/actuation nasal spray 1 spray (50 mcg total) by Each Nostril route 2 (two) times daily as needed. 15 g 6/3/2024 -- Shanique Grant PA-C    benzonatate (TESSALON) 100 MG capsule Take 1 capsule (100 mg total) by mouth 3 (three) times daily as needed. 20 capsule 6/3/2024 6/13/2024 Shanique Grant PA-C          Follow-up Information    None          Shanique Grant PA-C  06/03/24 0346

## 2024-06-05 LAB
OHS QRS DURATION: 82 MS
OHS QTC CALCULATION: 441 MS

## 2024-08-30 ENCOUNTER — HOSPITAL ENCOUNTER (EMERGENCY)
Facility: HOSPITAL | Age: 54
Discharge: HOME | End: 2024-08-30
Attending: EMERGENCY MEDICINE
Payer: COMMERCIAL

## 2024-08-30 VITALS
HEIGHT: 64 IN | HEART RATE: 71 BPM | WEIGHT: 293 LBS | SYSTOLIC BLOOD PRESSURE: 212 MMHG | TEMPERATURE: 98 F | BODY MASS INDEX: 50.02 KG/M2 | DIASTOLIC BLOOD PRESSURE: 98 MMHG | RESPIRATION RATE: 14 BRPM | OXYGEN SATURATION: 98 %

## 2024-08-30 DIAGNOSIS — I10 ESSENTIAL HYPERTENSION: Primary | ICD-10-CM

## 2024-08-30 DIAGNOSIS — R07.9 CHEST PAIN: ICD-10-CM

## 2024-08-30 LAB
ALBUMIN SERPL-MCNC: 3.3 G/DL (ref 3.3–5.5)
ALP SERPL-CCNC: 62 U/L (ref 42–141)
B-HCG UR QL: NEGATIVE
BILIRUB SERPL-MCNC: 0.6 MG/DL (ref 0.2–1.6)
BUN SERPL-MCNC: 11 MG/DL (ref 7–22)
CALCIUM SERPL-MCNC: 9.6 MG/DL (ref 8–10.3)
CHLORIDE SERPL-SCNC: 103 MMOL/L (ref 98–108)
CREAT SERPL-MCNC: 0.6 MG/DL (ref 0.6–1.2)
CTP QC/QA: YES
GLUCOSE SERPL-MCNC: 113 MG/DL (ref 73–118)
HCT, POC: NORMAL
HGB, POC: NORMAL (ref 14–18)
MCH, POC: NORMAL
MCHC, POC: NORMAL
MCV, POC: NORMAL
MPV, POC: NORMAL
OHS QRS DURATION: 88 MS
OHS QTC CALCULATION: 430 MS
POC ALT (SGPT): 17 U/L (ref 10–47)
POC AST (SGOT): 23 U/L (ref 11–38)
POC CARDIAC TROPONIN I: 0 NG/ML (ref 0–0.08)
POC CARDIAC TROPONIN I: 0.04 NG/ML (ref 0–0.08)
POC PLATELET COUNT: NORMAL
POC PTINR: 1.3 (ref 0.9–1.2)
POC PTWBT: 14.9 SEC (ref 9.7–14.3)
POC TCO2: 26 MMOL/L (ref 18–33)
POTASSIUM BLD-SCNC: 4.3 MMOL/L (ref 3.6–5.1)
PROTEIN, POC: 7.9 G/DL (ref 6.4–8.1)
RBC, POC: NORMAL
RDW, POC: NORMAL
SAMPLE: ABNORMAL
SAMPLE: NORMAL
SAMPLE: NORMAL
SODIUM BLD-SCNC: 143 MMOL/L (ref 128–145)
WBC, POC: NORMAL

## 2024-08-30 PROCEDURE — 93010 ELECTROCARDIOGRAM REPORT: CPT | Mod: ,,, | Performed by: INTERNAL MEDICINE

## 2024-08-30 PROCEDURE — 81025 URINE PREGNANCY TEST: CPT | Mod: ER

## 2024-08-30 PROCEDURE — 80053 COMPREHEN METABOLIC PANEL: CPT | Mod: ER

## 2024-08-30 PROCEDURE — 84484 ASSAY OF TROPONIN QUANT: CPT | Mod: ER

## 2024-08-30 PROCEDURE — 85025 COMPLETE CBC W/AUTO DIFF WBC: CPT | Mod: ER

## 2024-08-30 PROCEDURE — 99284 EMERGENCY DEPT VISIT MOD MDM: CPT | Mod: 25,ER

## 2024-08-30 PROCEDURE — 93005 ELECTROCARDIOGRAM TRACING: CPT | Mod: ER

## 2024-08-30 PROCEDURE — 25000003 PHARM REV CODE 250: Mod: ER | Performed by: EMERGENCY MEDICINE

## 2024-08-30 PROCEDURE — 81025 URINE PREGNANCY TEST: CPT | Mod: ER | Performed by: EMERGENCY MEDICINE

## 2024-08-30 RX ORDER — METOPROLOL TARTRATE 50 MG/1
50 TABLET ORAL 2 TIMES DAILY
Qty: 180 TABLET | Refills: 0 | Status: SHIPPED | OUTPATIENT
Start: 2024-08-30 | End: 2024-11-28

## 2024-08-30 RX ORDER — METOPROLOL TARTRATE 50 MG/1
50 TABLET ORAL
Status: COMPLETED | OUTPATIENT
Start: 2024-08-30 | End: 2024-08-30

## 2024-08-30 RX ADMIN — METOPROLOL TARTRATE 50 MG: 50 TABLET, FILM COATED ORAL at 09:08

## 2024-08-30 NOTE — ED NOTES
Patient presents to ed with c/o midsternal chest tightness that occurred at 0530 this morning. States episode lasted for approx 1 hour and ten minutes. Patient denies CP at present. Denies SOB. Patient presents with elevated BP. Patient states she does have a hx of HTN but has not been on medication in approx 6 years. Patient has no other complaints.  at bedside. Will cont to monitor.

## 2024-08-30 NOTE — ED NOTES
Patient resting in bed comfortably. Continues to deny chest pain at this time. Waiting repeat trop. Will cont to monitor

## 2024-08-30 NOTE — DISCHARGE INSTRUCTIONS
I have listed three different PCPs above. Please establish care with 1 of these or with a primary care physician of your choosing.  Please monitor your blood pressure readings at home as we discussed in the emergency department and take this log to your primary care physician.  Take your blood pressure medication as directed.  If any development of worsening chest pain, shortness of breath, extreme headache, or any other concerning symptoms, please immediately present to the closest emergency department.

## 2024-08-30 NOTE — ED PROVIDER NOTES
"Encounter Date: 2024       History     Chief Complaint   Patient presents with    Chest Pain     A 55 y/o female presents to the ER c/o substernal CP since this morning. Pt describes pain "tightness accompanied with bilateral arm pain." Denies SOB, N/V. Non radiating pain. /129  8/10 pain      Patient is a 54-year-old woman presenting to the emergency department today for evaluation of acute chest pain.  She states that the pain was a "sticking sensation" and states that the sensation was to her upper sternum near her clavicle area.  She states that both of her arms hurt at the time however she states that her symptoms have now completely resolved.  She denies any associated shortness of breath, diaphoresis, nausea.  Of note, patient states that she has not taken any prescribed medications for her blood pressure in many years.  She states she has been taking natural medications instead and drinking teas such as soursop tea to manage this.  She denies headache, blurring of vision, leg swelling.       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 All c-sections     Family History   Problem Relation Name Age of Onset    Hypertension Mother Huyen     Heart attack Father Dontrell      Social History     Tobacco Use    Smoking status: Never    Smokeless tobacco: Never   Substance Use Topics    Alcohol use: No    Drug use: No     Review of Systems   Constitutional:  Negative for chills and fever.   HENT:  Negative for congestion and sore throat.    Eyes:  Negative for pain and visual disturbance.   Respiratory:  Negative for shortness of breath and wheezing.    Cardiovascular:  Positive for chest pain.   Gastrointestinal:  " Negative for abdominal pain.   Genitourinary:  Negative for flank pain.   Musculoskeletal:  Negative for myalgias.   Skin:  Negative for color change.   Neurological:  Negative for weakness and headaches.   Hematological:  Does not bruise/bleed easily.   Psychiatric/Behavioral:  Negative for suicidal ideas.    All other systems reviewed and are negative.      Physical Exam     Initial Vitals [08/30/24 0846]   BP Pulse Resp Temp SpO2   (!) 198/129 81 18 98.3 °F (36.8 °C) 96 %      MAP       --         Physical Exam    Nursing note and vitals reviewed.  Constitutional: She appears well-developed and well-nourished. No distress.   HENT:   Head: Normocephalic and atraumatic.   Mouth/Throat: Oropharynx is clear and moist.   Eyes: Conjunctivae and EOM are normal. Pupils are equal, round, and reactive to light. Right eye exhibits no discharge. Left eye exhibits no discharge.   Neck: Neck supple.   Normal range of motion.  Cardiovascular:  Normal rate and regular rhythm.     Exam reveals no gallop, no friction rub and no decreased pulses.       No murmur heard.  Pulmonary/Chest: Breath sounds normal. No respiratory distress. She has no wheezes. She has no rhonchi. She has no rales.   Abdominal: Abdomen is soft. She exhibits no distension. There is no abdominal tenderness.   Musculoskeletal:         General: No tenderness. Normal range of motion.      Cervical back: Normal range of motion and neck supple.     Neurological: She is alert and oriented to person, place, and time.   Skin: Skin is warm and dry.   Psychiatric: She has a normal mood and affect.         ED Course   Procedures  Labs Reviewed   ISTAT PROCEDURE - Abnormal       Result Value    POC PTWBT 14.9 (*)     POC PTINR 1.3 (*)     Sample unknown     TROPONIN ISTAT    POC Cardiac Troponin I 0.00      Sample unknown     TROPONIN ISTAT    POC Cardiac Troponin I 0.04      Sample unknown     POCT URINE PREGNANCY    POC Preg Test, Ur Negative        "Acceptable Yes     POCT CBC    Hematocrit        Hemoglobin        RBC        WBC        MCV        MCH, POC        MCHC        RDW-CV        Platelet Count, POC        MPV       POCT CMP   POCT PROTIME-INR   POCT TROPONIN   POCT CMP    Albumin, POC 3.3      Alkaline Phosphatase, POC 62      ALT (SGPT), POC 17      AST (SGOT), POC 23      POC BUN 11      Calcium, POC 9.6      POC Chloride 103      POC Creatinine 0.6      POC Glucose 113      POC Potassium 4.3      POC Sodium 143      Bilirubin, POC 0.6      POC TCO2 26      Protein, POC 7.9     POCT TROPONIN     EKG Readings: (Independently Interpreted)   Initial Reading: No STEMI. Rhythm: Normal Sinus Rhythm. Heart Rate: 88. Ectopy: No Ectopy.       Imaging Results              X-Ray Chest PA And Lateral (Final result)  Result time 08/30/24 11:09:01      Final result by Baldo Drew MD (08/30/24 11:09:01)                   Impression:      No acute cardiopulmonary finding.  No detrimental change when compared with 06/03/2024.      Electronically signed by: Baldo Drew MD  Date:    08/30/2024  Time:    11:09               Narrative:    EXAMINATION:  XR CHEST PA AND LATERAL    CLINICAL HISTORY:  Provided history is "Chest Pain;  ".    TECHNIQUE:  Frontal and lateral views of the chest were performed.    COMPARISON:  06/03/2024.    FINDINGS:  Cardiac wires overlie the chest.  Cardiomediastinal silhouette is borderline enlarged and similar to the prior study.  Atherosclerotic calcifications overlie the aortic arch.  No focal consolidation.  No sizable pleural effusion.  No pneumothorax.                                       Medications   metoprolol tartrate (LOPRESSOR) tablet 50 mg (50 mg Oral Given 8/30/24 0932)     Medical Decision Making  This is an emergent evaluation of a 54-year-old woman presenting to the emergency department today for evaluation of chest pain.  Differential diagnoses include ACS, angina, essential hypertension, hypertensive " emergency, hypertensive urgency, amongst others.  On physical examination, patient was in no acute distress.  Heart and lung sounds were within normal limits.  She had no reproducible tenderness to palpation to the anterior chest wall.  There were no skin lesions.  EKG was obtained and showed a normal sinus rhythm at 88 beats per minute, no STEMI.  I have ordered labs, imaging, and her home blood pressure medication.  Disposition is pending.    Muna Escalante MD  9:11 AM  8/30/2024    Patient's labs returned and were reassuring.  Her troponin is 0.00.  Her chest x-ray is pending at this time.  I will repeat a troponin in approximately 1 hour thus that will be 6 hours since her chest pain occurred.  Disposition is pending.    Muna Escalante MD  10:45 AM  8/30/2024    Patient's troponin remained negative.  She remained chest pain free here in the emergency department.  I counseled her extensively about taking her blood pressure medications and obtaining a primary care physician.  I have given her the names of 3 different primary care physicians to attempt to obtain follow-up.  She has been given return precautions including any development of significant chest pain, shortness of breath, headache, or for any other concerning symptoms.  She has voiced understanding of this and was discharged to home in stable conditions.     Muna Escalante MD  12:07 PM  8/30/2024           Amount and/or Complexity of Data Reviewed  Labs: ordered.    Risk  Prescription drug management.                                      Clinical Impression:  Final diagnoses:  [R07.9] Chest pain  [I10] Essential hypertension (Primary)          ED Disposition Condition    Discharge Stable          ED Prescriptions       Medication Sig Dispense Start Date End Date Auth. Provider    metoprolol tartrate (LOPRESSOR) 50 MG tablet Take 1 tablet (50 mg total) by mouth 2 (two) times daily. 180 tablet 8/30/2024 11/28/2024 Muna Escalante MD          Follow-up Information        Follow up With Specialties Details Why Contact Info    Kenroy Morrell MD Internal Medicine, Wound Care Schedule an appointment as soon as possible for a visit in 1 week  [address] [facility]  [place] [address] [phone]      Delfino Martínez MD Family Medicine, Wound Care Schedule an appointment as soon as possible for a visit in 1 week  [address] [facility]  [place] [address] [phone]      Cristal Dias MD Internal Medicine, Wound Care Schedule an appointment as soon as possible for a visit   [address] [place] [address]  [address]  [place] [address] [phone]               Muna Escalante MD  08/30/24 1879

## 2024-09-10 ENCOUNTER — OFFICE VISIT (OUTPATIENT)
Dept: FAMILY MEDICINE | Facility: CLINIC | Age: 54
End: 2024-09-10
Payer: COMMERCIAL

## 2024-09-10 VITALS
DIASTOLIC BLOOD PRESSURE: 84 MMHG | TEMPERATURE: 99 F | WEIGHT: 293 LBS | BODY MASS INDEX: 50.02 KG/M2 | OXYGEN SATURATION: 98 % | SYSTOLIC BLOOD PRESSURE: 138 MMHG | HEIGHT: 64 IN | RESPIRATION RATE: 17 BRPM | HEART RATE: 87 BPM

## 2024-09-10 DIAGNOSIS — I10 ESSENTIAL HYPERTENSION: Primary | ICD-10-CM

## 2024-09-10 DIAGNOSIS — Z12.31 ENCOUNTER FOR SCREENING MAMMOGRAM FOR MALIGNANT NEOPLASM OF BREAST: ICD-10-CM

## 2024-09-10 DIAGNOSIS — Z00.00 ANNUAL PHYSICAL EXAM: ICD-10-CM

## 2024-09-10 DIAGNOSIS — Z12.11 ENCOUNTER FOR SCREENING FOR MALIGNANT NEOPLASM OF COLON: ICD-10-CM

## 2024-09-10 PROCEDURE — 99999 PR PBB SHADOW E&M-EST. PATIENT-LVL V: CPT | Mod: PBBFAC,,,

## 2024-09-10 NOTE — PROGRESS NOTES
Family Medicine     Patient name: Rach Davalos  MRN: [id number]  : 1970  PCP NAME: Man Mccartney MD    Subjective     History of Present Illness:  Patient ID: Rach Davalos is a 54 y.o. Black or  female presents to the clinic today. Chronic medical issues, if present, have been documented.   Active Problem List with Overview Notes    Diagnosis Date Noted    Chest pain 2024    Essential hypertension 2018    Hyperlipidemia 2017    Spontaneous dissection of coronary artery 2017    Metabolic syndrome 2014    Morbidly obese 2014       Chief Complaint  Chief Complaint   Patient presents with    Follow-up     Blood vessel in eye about 3days / went to er last Wednesday for  pressure      Patient presents today to establish care with new physician and for annual exam.  Was recently seen in the ER for severely elevated blood pressure.  Systolic in the 190s. Cardiac workup was unremarkable.  She was discharged with oral metoprolol and asked to follow up with PCP.  Today, she reports she has been compliant with medicine and blood pressure is 138/84mmhg.  She denies lightheadedness, chest pain, shortness of breath on exertion and palpitations.  She also notes subconjunctival hemorrhage in her left eye which was present before she decided to go to the ER.  No new visual changes.  She is a  who has been teaching for 20 years.  She is physically active.        Medications   Medication List with Changes/Refills   Current Medications    ASPIRIN (ECOTRIN) 81 MG EC TABLET    Take 1 tablet (81 mg total) by mouth once daily.    ATORVASTATIN (LIPITOR) 80 MG TABLET    TAKE ONE TABLET BY MOUTH ONCE DAILY    BACLOFEN (LIORESAL) 10 MG TABLET    Take 1 tablet (10 mg total) by mouth 3 (three) times daily.    CETIRIZINE (ZYRTEC) 10 MG TABLET    Take 1 tablet (10 mg total) by mouth once daily.    CLOPIDOGREL (PLAVIX) 75 MG TABLET    Take 1 tablet (75  "mg total) by mouth once daily.    FAMOTIDINE (PEPCID) 20 MG TABLET    Take 1 tablet (20 mg total) by mouth 2 (two) times daily.    FLUTICASONE PROPIONATE (FLONASE) 50 MCG/ACTUATION NASAL SPRAY    1 spray (50 mcg total) by Each Nostril route 2 (two) times daily as needed.    IBUPROFEN (ADVIL,MOTRIN) 600 MG TABLET    Take 1 tablet (600 mg total) by mouth every 6 (six) hours as needed for Pain.    MELOXICAM (MOBIC) 15 MG TABLET    Take 1 tablet (15 mg total) by mouth once daily.    METOPROLOL TARTRATE (LOPRESSOR) 50 MG TABLET    Take 1 tablet (50 mg total) by mouth 2 (two) times daily.    NITROGLYCERIN (NITROSTAT) 0.4 MG SL TABLET    Place 1 tablet (0.4 mg total) under the tongue every 5 (five) minutes as needed for Chest pain.    ONDANSETRON (ZOFRAN-ODT) 8 MG TBDL    Take 1 tablet (8 mg total) by mouth every 6 (six) hours as needed (n/v).       Allergies  Review of patient's allergies indicates:  No Known Allergies  Past Medical history  Past Medical History:   Diagnosis Date    Acute coronary syndrome 2017    ?spont RPDA coronary dissection, not PCI target, EF normal    Hyperlipidemia           Surgical History  Past Surgical History:   Procedure Laterality Date    CARDIAC CATHETERIZATION       SECTION      4 kids. All c-sections     Family History  Family History   Problem Relation Name Age of Onset    Hypertension Mother Huyen     Heart attack Father Dontrell      Social History  Social History     Tobacco Use    Smoking status: Never    Smokeless tobacco: Never   Substance Use Topics    Alcohol use: No    Drug use: No          Review of system     ROS  Negative except as mentioned above  Physical exam   Vital Signs  Vitals:    09/10/24 1551   BP: 138/84   BP Location: Left arm   Patient Position: Sitting   BP Method: X-Large (Manual)   Pulse: 87   Resp: 17   Temp: 98.7 °F (37.1 °C)   TempSrc: Oral   SpO2: 98%   Weight: 133.8 kg (294 lb 15.6 oz)   Height: 5' 4" (1.626 m)       Physical " Exam  Constitutional:       Appearance: Normal appearance. She is obese.   Eyes:      Conjunctiva/sclera:      Left eye: Hemorrhage (subconjunctival) present.   Cardiovascular:      Rate and Rhythm: Normal rate and regular rhythm.      Pulses: Normal pulses.      Heart sounds: Normal heart sounds.   Pulmonary:      Effort: Pulmonary effort is normal. No respiratory distress.      Breath sounds: Normal breath sounds. No wheezing.   Abdominal:      General: Bowel sounds are normal. There is no distension.      Palpations: Abdomen is soft.      Tenderness: There is no abdominal tenderness.   Musculoskeletal:      Right lower leg: No edema.      Left lower leg: No edema.   Skin:     General: Skin is warm.   Neurological:      Mental Status: She is alert and oriented to person, place, and time.         Wt Readings from Last 3 Encounters:   09/10/24 133.8 kg (294 lb 15.6 oz)   08/30/24 133.8 kg (295 lb)   06/03/24 129.3 kg (285 lb)          Body mass index is 50.63 kg/m².      Laboratory data and other diagnostic findings     Lab Results   Component Value Date    WBC 8.86 12/01/2017    HGB 13.1 12/01/2017    HCT 40.1 12/01/2017    MCV 84 12/01/2017     12/01/2017         Lab Results   Component Value Date    CREATININE 0.8 12/01/2017    CREATININE 0.8 12/01/2017    BUN 9 12/01/2017    BUN 9 12/01/2017     12/01/2017     12/01/2017    K 4.3 12/01/2017    K 4.3 12/01/2017     12/01/2017     12/01/2017    CO2 27 12/01/2017    CO2 27 12/01/2017         Assessment and plan       ICD-10-CM ICD-9-CM   1. Essential hypertension  I10 401.9   2. Annual physical exam  Z00.00 V70.0   3. Encounter for screening for malignant neoplasm of colon  Z12.11 V76.51   4. Encounter for screening mammogram for malignant neoplasm of breast  Z12.31 V76.12        Essential hypertension  Blood pressure today is at target.  No medication changes, continue metoprolol.  Blood pressure log given.  She will return to clinic  in 1 month for review of the log and a decision on further medication.  -     CBC Auto Differential; Future; Expected date: 09/10/2024  -     Hemoglobin A1C; Future; Expected date: 09/10/2024  -     Lipid Panel; Future; Expected date: 09/10/2024  -     TSH; Future; Expected date: 09/10/2024  -     T4, Free; Future; Expected date: 09/10/2024  -     Comprehensive Metabolic Panel; Future; Expected date: 09/10/2024    Annual physical exam  Counseled on age appropriate medical preventative services, including age appropriate cancer screenings, over all nutritional health, need for a consistent exercise regimen and an over all push towards maintaining a vigorous and active lifestyle. Counseled on age appropriate vaccines and discussed upcoming health care needs based on age/gender. Spent time with patient counseling on need for a good patient/doctor relationship moving forward. Discussed use of common OTC medications and supplements. Discussed common dietary aids and use of caffeine and the need for good sleep hygiene and stress management     -     HEPATITIS C ANTIBODY; Future; Expected date: 09/10/2024  -     HIV 1/2 Ag/Ab (4th Gen); Future; Expected date: 09/10/2024  -     Ambulatory referral/consult to Obstetrics / Gynecology; Future; Expected date: 09/17/2024    Encounter for screening for malignant neoplasm of colon  -     Ambulatory referral/consult to Endo Procedure ; Future; Expected date: 09/11/2024    Encounter for screening mammogram for malignant neoplasm of breast  -     Mammo Digital Screening Bilat w/ Del; Future; Expected date: 09/10/2024        Man Mccartney MD    This office note was created by combination of typed  and MModal dictation.  Transcription errors may be present.  If there are any questions, please contact me.       40  minutes of total time spent on the encounter, which includes face to face time and non-face to face time preparing to see the patient (eg, review  of tests), Obtaining and/or reviewing separately obtained history, Documenting clinical information in the electronic or other health record, Independently interpreting results (not separately reported) and communicating results to the patient/family/caregiver, or Care coordination (not separately reported).

## 2024-09-10 NOTE — PROGRESS NOTES
Health Maintenance Due   Topic     Hepatitis C Screening  Consult PCP     HIV Screening  Consult PCP     Colorectal Cancer Screening  Consult PCP     Cervical Cancer Screening  Consult PCP     Mammogram  Consult PCP     TETANUS VACCINE  Consult PCP     Hemoglobin A1c (Diabetic Prevention Screening)  Consult PCP     Shingles Vaccine (1 of 2) Consult PCP     Lipid Panel  Consult PCP     Influenza Vaccine (1) Consult PCP     COVID-19 Vaccine (2 - 2023-24 season) Not offered at this office

## 2024-09-16 ENCOUNTER — LAB VISIT (OUTPATIENT)
Dept: LAB | Facility: HOSPITAL | Age: 54
End: 2024-09-16
Payer: COMMERCIAL

## 2024-09-16 DIAGNOSIS — I10 ESSENTIAL HYPERTENSION: ICD-10-CM

## 2024-09-16 DIAGNOSIS — Z00.00 ANNUAL PHYSICAL EXAM: ICD-10-CM

## 2024-09-16 LAB
ALBUMIN SERPL BCP-MCNC: 3.2 G/DL (ref 3.5–5.2)
ALP SERPL-CCNC: 61 U/L (ref 55–135)
ALT SERPL W/O P-5'-P-CCNC: 12 U/L (ref 10–44)
ANION GAP SERPL CALC-SCNC: 6 MMOL/L (ref 8–16)
AST SERPL-CCNC: 14 U/L (ref 10–40)
BASOPHILS # BLD AUTO: 0.03 K/UL (ref 0–0.2)
BASOPHILS NFR BLD: 0.5 % (ref 0–1.9)
BILIRUB SERPL-MCNC: 0.4 MG/DL (ref 0.1–1)
BUN SERPL-MCNC: 14 MG/DL (ref 6–20)
CALCIUM SERPL-MCNC: 9.3 MG/DL (ref 8.7–10.5)
CHLORIDE SERPL-SCNC: 107 MMOL/L (ref 95–110)
CHOLEST SERPL-MCNC: 190 MG/DL (ref 120–199)
CHOLEST/HDLC SERPL: 3.6 {RATIO} (ref 2–5)
CO2 SERPL-SCNC: 25 MMOL/L (ref 23–29)
CREAT SERPL-MCNC: 0.9 MG/DL (ref 0.5–1.4)
DIFFERENTIAL METHOD BLD: ABNORMAL
EOSINOPHIL # BLD AUTO: 0.2 K/UL (ref 0–0.5)
EOSINOPHIL NFR BLD: 2.7 % (ref 0–8)
ERYTHROCYTE [DISTWIDTH] IN BLOOD BY AUTOMATED COUNT: 14.8 % (ref 11.5–14.5)
EST. GFR  (NO RACE VARIABLE): >60 ML/MIN/1.73 M^2
ESTIMATED AVG GLUCOSE: 123 MG/DL (ref 68–131)
GLUCOSE SERPL-MCNC: 112 MG/DL (ref 70–110)
HBA1C MFR BLD: 5.9 % (ref 4–5.6)
HCT VFR BLD AUTO: 42.6 % (ref 37–48.5)
HCV AB SERPL QL IA: NORMAL
HDLC SERPL-MCNC: 53 MG/DL (ref 40–75)
HDLC SERPL: 27.9 % (ref 20–50)
HGB BLD-MCNC: 12.9 G/DL (ref 12–16)
HIV 1+2 AB+HIV1 P24 AG SERPL QL IA: NORMAL
IMM GRANULOCYTES # BLD AUTO: 0.02 K/UL (ref 0–0.04)
IMM GRANULOCYTES NFR BLD AUTO: 0.4 % (ref 0–0.5)
LDLC SERPL CALC-MCNC: 119 MG/DL (ref 63–159)
LYMPHOCYTES # BLD AUTO: 1.8 K/UL (ref 1–4.8)
LYMPHOCYTES NFR BLD: 32.9 % (ref 18–48)
MCH RBC QN AUTO: 26.7 PG (ref 27–31)
MCHC RBC AUTO-ENTMCNC: 30.3 G/DL (ref 32–36)
MCV RBC AUTO: 88 FL (ref 82–98)
MONOCYTES # BLD AUTO: 0.5 K/UL (ref 0.3–1)
MONOCYTES NFR BLD: 9.1 % (ref 4–15)
NEUTROPHILS # BLD AUTO: 3.1 K/UL (ref 1.8–7.7)
NEUTROPHILS NFR BLD: 54.4 % (ref 38–73)
NONHDLC SERPL-MCNC: 137 MG/DL
NRBC BLD-RTO: 0 /100 WBC
PLATELET # BLD AUTO: 264 K/UL (ref 150–450)
PMV BLD AUTO: 10.3 FL (ref 9.2–12.9)
POTASSIUM SERPL-SCNC: 4.5 MMOL/L (ref 3.5–5.1)
PROT SERPL-MCNC: 7.4 G/DL (ref 6–8.4)
RBC # BLD AUTO: 4.84 M/UL (ref 4–5.4)
SODIUM SERPL-SCNC: 138 MMOL/L (ref 136–145)
T4 FREE SERPL-MCNC: 0.93 NG/DL (ref 0.71–1.51)
TRIGL SERPL-MCNC: 90 MG/DL (ref 30–150)
TSH SERPL DL<=0.005 MIU/L-ACNC: 2.08 UIU/ML (ref 0.4–4)
WBC # BLD AUTO: 5.6 K/UL (ref 3.9–12.7)

## 2024-09-16 PROCEDURE — 80053 COMPREHEN METABOLIC PANEL: CPT

## 2024-09-16 PROCEDURE — 80061 LIPID PANEL: CPT

## 2024-09-16 PROCEDURE — 86803 HEPATITIS C AB TEST: CPT

## 2024-09-16 PROCEDURE — 83036 HEMOGLOBIN GLYCOSYLATED A1C: CPT

## 2024-09-16 PROCEDURE — 36415 COLL VENOUS BLD VENIPUNCTURE: CPT | Mod: PO

## 2024-09-16 PROCEDURE — 84443 ASSAY THYROID STIM HORMONE: CPT

## 2024-09-16 PROCEDURE — 84439 ASSAY OF FREE THYROXINE: CPT

## 2024-09-16 PROCEDURE — 87389 HIV-1 AG W/HIV-1&-2 AB AG IA: CPT

## 2024-09-16 PROCEDURE — 85025 COMPLETE CBC W/AUTO DIFF WBC: CPT

## 2024-10-17 ENCOUNTER — HOSPITAL ENCOUNTER (OUTPATIENT)
Dept: RADIOLOGY | Facility: HOSPITAL | Age: 54
Discharge: HOME | End: 2024-10-17
Payer: COMMERCIAL

## 2024-10-17 DIAGNOSIS — Z12.31 ENCOUNTER FOR SCREENING MAMMOGRAM FOR MALIGNANT NEOPLASM OF BREAST: ICD-10-CM

## 2024-10-17 PROCEDURE — 77063 BREAST TOMOSYNTHESIS BI: CPT | Mod: TC

## 2024-10-17 PROCEDURE — 77067 SCR MAMMO BI INCL CAD: CPT | Mod: TC

## 2024-10-29 ENCOUNTER — OFFICE VISIT (OUTPATIENT)
Dept: FAMILY MEDICINE | Facility: CLINIC | Age: 54
End: 2024-10-29
Payer: COMMERCIAL

## 2024-10-29 VITALS
WEIGHT: 293 LBS | OXYGEN SATURATION: 96 % | RESPIRATION RATE: 18 BRPM | HEIGHT: 64 IN | DIASTOLIC BLOOD PRESSURE: 84 MMHG | HEART RATE: 90 BPM | SYSTOLIC BLOOD PRESSURE: 136 MMHG | TEMPERATURE: 99 F | BODY MASS INDEX: 50.02 KG/M2

## 2024-10-29 DIAGNOSIS — I25.10 ASCVD (ARTERIOSCLEROTIC CARDIOVASCULAR DISEASE): Primary | ICD-10-CM

## 2024-10-29 DIAGNOSIS — E78.2 MIXED HYPERLIPIDEMIA: ICD-10-CM

## 2024-10-29 DIAGNOSIS — I25.42 SPONTANEOUS DISSECTION OF CORONARY ARTERY: ICD-10-CM

## 2024-10-29 DIAGNOSIS — I10 ESSENTIAL HYPERTENSION: ICD-10-CM

## 2024-10-29 PROBLEM — R07.9 CHEST PAIN: Status: RESOLVED | Noted: 2024-08-30 | Resolved: 2024-10-29

## 2024-10-29 PROCEDURE — 99999 PR PBB SHADOW E&M-EST. PATIENT-LVL IV: CPT | Mod: PBBFAC,,,

## 2024-10-29 RX ORDER — ATORVASTATIN CALCIUM 80 MG/1
80 TABLET, FILM COATED ORAL DAILY
Qty: 90 TABLET | Refills: 0 | Status: SHIPPED | OUTPATIENT
Start: 2024-10-29

## 2024-10-29 RX ORDER — METOPROLOL TARTRATE 50 MG/1
50 TABLET ORAL 2 TIMES DAILY
Qty: 60 TABLET | Refills: 3 | Status: SHIPPED | OUTPATIENT
Start: 2024-10-29 | End: 2025-02-26

## 2024-12-18 ENCOUNTER — PATIENT OUTREACH (OUTPATIENT)
Dept: ADMINISTRATIVE | Facility: HOSPITAL | Age: 54
End: 2024-12-18
Payer: COMMERCIAL

## 2025-01-28 ENCOUNTER — CLINICAL SUPPORT (OUTPATIENT)
Dept: ENDOSCOPY | Facility: HOSPITAL | Age: 55
End: 2025-01-28
Payer: COMMERCIAL

## 2025-01-28 DIAGNOSIS — Z12.11 ENCOUNTER FOR SCREENING FOR MALIGNANT NEOPLASM OF COLON: ICD-10-CM

## 2025-02-28 ENCOUNTER — PATIENT OUTREACH (OUTPATIENT)
Dept: ADMINISTRATIVE | Facility: HOSPITAL | Age: 55
End: 2025-02-28
Payer: COMMERCIAL

## 2025-02-28 NOTE — PROGRESS NOTES
Population Health Chart Review & Patient Outreach Details      Additional Pop Health Notes:        HM and immunization's reviewed and updated.  DUE FOR colonoscopy. IF ALREADY DONE, NEED TO GET RECORDS INFORMATION.  Place in visit note to advise.          Updates Requested / Reviewed:      Updated Care Coordination Note, Care Everywhere, and Care Team Updated         Health Maintenance Topics Overdue:      [facility] Score: 1     Colon Cancer Screening                       Health Maintenance Topic(s) Outreach Outcomes & Actions Taken:    Colorectal Cancer Screening - Outreach Outcomes & Actions Taken  : Place in visit note to advise.

## 2025-03-18 ENCOUNTER — OFFICE VISIT (OUTPATIENT)
Dept: FAMILY MEDICINE | Facility: CLINIC | Age: 55
End: 2025-03-18
Payer: COMMERCIAL

## 2025-03-18 ENCOUNTER — PATIENT OUTREACH (OUTPATIENT)
Dept: ADMINISTRATIVE | Facility: HOSPITAL | Age: 55
End: 2025-03-18
Payer: COMMERCIAL

## 2025-03-18 VITALS
BODY MASS INDEX: 50.02 KG/M2 | RESPIRATION RATE: 18 BRPM | HEIGHT: 64 IN | DIASTOLIC BLOOD PRESSURE: 82 MMHG | WEIGHT: 293 LBS | HEART RATE: 72 BPM | TEMPERATURE: 99 F | OXYGEN SATURATION: 95 % | SYSTOLIC BLOOD PRESSURE: 126 MMHG

## 2025-03-18 DIAGNOSIS — I25.42 SPONTANEOUS DISSECTION OF CORONARY ARTERY: ICD-10-CM

## 2025-03-18 DIAGNOSIS — I25.10 ASCVD (ARTERIOSCLEROTIC CARDIOVASCULAR DISEASE): ICD-10-CM

## 2025-03-18 DIAGNOSIS — I10 ESSENTIAL HYPERTENSION: ICD-10-CM

## 2025-03-18 DIAGNOSIS — R42 INTERMITTENT LIGHTHEADEDNESS: Primary | ICD-10-CM

## 2025-03-18 PROCEDURE — 99999 PR PBB SHADOW E&M-EST. PATIENT-LVL IV: CPT | Mod: PBBFAC,,,

## 2025-03-18 PROCEDURE — 99214 OFFICE O/P EST MOD 30 MIN: CPT | Mod: S$GLB,,,

## 2025-03-18 PROCEDURE — 3074F SYST BP LT 130 MM HG: CPT | Mod: CPTII,S$GLB,,

## 2025-03-18 PROCEDURE — 3008F BODY MASS INDEX DOCD: CPT | Mod: CPTII,S$GLB,,

## 2025-03-18 PROCEDURE — 3079F DIAST BP 80-89 MM HG: CPT | Mod: CPTII,S$GLB,,

## 2025-03-18 RX ORDER — METOPROLOL TARTRATE 50 MG/1
50 TABLET ORAL 2 TIMES DAILY
Qty: 60 TABLET | Refills: 3 | Status: SHIPPED | OUTPATIENT
Start: 2025-03-18 | End: 2025-07-16

## 2025-03-18 RX ORDER — NITROGLYCERIN 0.4 MG/1
0.4 TABLET SUBLINGUAL EVERY 5 MIN PRN
Qty: 10 EACH | Refills: 0 | Status: SHIPPED | OUTPATIENT
Start: 2025-03-18 | End: 2026-03-18

## 2025-03-18 NOTE — PROGRESS NOTES
Health Maintenance Due   Topic Date Due    Colorectal Cancer Screening  Consult PCP     TETANUS VACCINE  Consult PCP     Shingles Vaccine (1 of 2) Consult PCP     Pneumococcal Vaccines (Age 50+) (1 of 1 - PCV) Consult PCP     Influenza Vaccine (1) Consult PCP     COVID-19 Vaccine (2 - 2024-25 season) Consult PCP

## 2025-03-18 NOTE — PROGRESS NOTES
Population Health Chart Review & Patient Outreach Details      Additional Pop Health Notes:      HM and immunization's reviewed and updated.  DUE FOR COLON SCREENING. IF ALREADY DONE, NEED TO GET RECORDS INFORMATION.    Place in visit note to advise.          Updates Requested / Reviewed:      Updated Care Coordination Note, Care Everywhere, and Care Team Updated         Health Maintenance Topics Overdue:      [facility] Score: 1     Colon Cancer Screening

## 2025-03-19 ENCOUNTER — LAB VISIT (OUTPATIENT)
Dept: LAB | Facility: HOSPITAL | Age: 55
End: 2025-03-19
Payer: COMMERCIAL

## 2025-03-19 DIAGNOSIS — R42 INTERMITTENT LIGHTHEADEDNESS: ICD-10-CM

## 2025-03-19 LAB
ALBUMIN SERPL BCP-MCNC: 3.1 G/DL (ref 3.5–5.2)
ALP SERPL-CCNC: 68 U/L (ref 40–150)
ALT SERPL W/O P-5'-P-CCNC: 14 U/L (ref 10–44)
ANION GAP SERPL CALC-SCNC: 9 MMOL/L (ref 8–16)
AST SERPL-CCNC: 18 U/L (ref 10–40)
BASOPHILS # BLD AUTO: 0.03 K/UL (ref 0–0.2)
BASOPHILS NFR BLD: 0.5 % (ref 0–1.9)
BILIRUB SERPL-MCNC: 0.3 MG/DL (ref 0.1–1)
BUN SERPL-MCNC: 13 MG/DL (ref 6–20)
CALCIUM SERPL-MCNC: 8.6 MG/DL (ref 8.7–10.5)
CHLORIDE SERPL-SCNC: 109 MMOL/L (ref 95–110)
CO2 SERPL-SCNC: 22 MMOL/L (ref 23–29)
CREAT SERPL-MCNC: 0.8 MG/DL (ref 0.5–1.4)
DIFFERENTIAL METHOD BLD: ABNORMAL
EOSINOPHIL # BLD AUTO: 0.2 K/UL (ref 0–0.5)
EOSINOPHIL NFR BLD: 2.6 % (ref 0–8)
ERYTHROCYTE [DISTWIDTH] IN BLOOD BY AUTOMATED COUNT: 15.1 % (ref 11.5–14.5)
EST. GFR  (NO RACE VARIABLE): >60 ML/MIN/1.73 M^2
GLUCOSE SERPL-MCNC: 103 MG/DL (ref 70–110)
HCT VFR BLD AUTO: 44 % (ref 37–48.5)
HGB BLD-MCNC: 13.1 G/DL (ref 12–16)
IMM GRANULOCYTES # BLD AUTO: 0.02 K/UL (ref 0–0.04)
IMM GRANULOCYTES NFR BLD AUTO: 0.3 % (ref 0–0.5)
LYMPHOCYTES # BLD AUTO: 1.8 K/UL (ref 1–4.8)
LYMPHOCYTES NFR BLD: 31 % (ref 18–48)
MAGNESIUM SERPL-MCNC: 2 MG/DL (ref 1.6–2.6)
MCH RBC QN AUTO: 26.4 PG (ref 27–31)
MCHC RBC AUTO-ENTMCNC: 29.8 G/DL (ref 32–36)
MCV RBC AUTO: 89 FL (ref 82–98)
MONOCYTES # BLD AUTO: 0.5 K/UL (ref 0.3–1)
MONOCYTES NFR BLD: 8.3 % (ref 4–15)
NEUTROPHILS # BLD AUTO: 3.3 K/UL (ref 1.8–7.7)
NEUTROPHILS NFR BLD: 57.3 % (ref 38–73)
NRBC BLD-RTO: 0 /100 WBC
PHOSPHATE SERPL-MCNC: 3.4 MG/DL (ref 2.7–4.5)
PLATELET # BLD AUTO: 247 K/UL (ref 150–450)
PMV BLD AUTO: 10.8 FL (ref 9.2–12.9)
POTASSIUM SERPL-SCNC: 4.5 MMOL/L (ref 3.5–5.1)
PROT SERPL-MCNC: 7.4 G/DL (ref 6–8.4)
RBC # BLD AUTO: 4.96 M/UL (ref 4–5.4)
SODIUM SERPL-SCNC: 140 MMOL/L (ref 136–145)
WBC # BLD AUTO: 5.75 K/UL (ref 3.9–12.7)

## 2025-03-19 PROCEDURE — 80053 COMPREHEN METABOLIC PANEL: CPT

## 2025-03-19 PROCEDURE — 85025 COMPLETE CBC W/AUTO DIFF WBC: CPT

## 2025-03-19 PROCEDURE — 36415 COLL VENOUS BLD VENIPUNCTURE: CPT | Mod: PO

## 2025-03-19 PROCEDURE — 83735 ASSAY OF MAGNESIUM: CPT

## 2025-03-19 PROCEDURE — 84100 ASSAY OF PHOSPHORUS: CPT
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
## 2025-04-01 ENCOUNTER — TELEPHONE (OUTPATIENT)
Dept: FAMILY MEDICINE | Facility: CLINIC | Age: 55
End: 2025-04-01

## 2025-04-01 ENCOUNTER — OFFICE VISIT (OUTPATIENT)
Dept: FAMILY MEDICINE | Facility: CLINIC | Age: 55
End: 2025-04-01
Payer: COMMERCIAL

## 2025-04-01 VITALS
BODY MASS INDEX: 49.83 KG/M2 | HEART RATE: 83 BPM | WEIGHT: 291.88 LBS | RESPIRATION RATE: 18 BRPM | TEMPERATURE: 98 F | OXYGEN SATURATION: 98 % | HEIGHT: 64 IN | DIASTOLIC BLOOD PRESSURE: 94 MMHG | SYSTOLIC BLOOD PRESSURE: 149 MMHG

## 2025-04-01 DIAGNOSIS — R42 INTERMITTENT LIGHTHEADEDNESS: Primary | ICD-10-CM

## 2025-04-01 PROCEDURE — 99999 PR PBB SHADOW E&M-EST. PATIENT-LVL IV: CPT | Mod: PBBFAC,,,

## 2025-04-01 NOTE — TELEPHONE ENCOUNTER
Spoke to pt and advised her paperwork was faxed to [facility]; she will reach out to them tomorrow to see if it has been received

## 2025-04-01 NOTE — PROGRESS NOTES
Health Maintenance Due   Topic Date Due    Aspirin/Antiplatelet Therapy  Consult PCP     Colorectal Cancer Screening  Consult PCP     TETANUS VACCINE  Consult PCP     Shingles Vaccine (1 of 2) Consult PCP     Pneumococcal Vaccines (Age 50+) (1 of 1 - PCV) Consult PCP     Influenza Vaccine (1) Consult PCP     COVID-19 Vaccine (2 - 2024-25 season) Consult PCP

## 2025-04-01 NOTE — PROGRESS NOTES
Family Medicine     Patient name: Rach Davalos  MRN: [id number]  : 1970  PCP NAME: Man Mccartney MD    Subjective     History of Present Illness:  Patient ID: Rach Davalos is a 54 y.o. Black or  female presents to the clinic today. Chronic medical issues, if present, have been documented.   Active Problem List with Overview Notes    Diagnosis Date Noted    Essential hypertension 2018    Hyperlipidemia 2017    Spontaneous dissection of coronary artery 2017    Metabolic syndrome 2014    Morbidly obese 2014       Chief Complaint  Chief Complaint   Patient presents with    Follow-up     F/ u / dizziness      Presents for follow-up of presyncopal symptoms.  Has been feeling much better since time of.  Still a little dizzy. Now exercising.   Has follow up with Cardiology coming up.  Was not able to  blood pressure medication as it was sent to the wrong pharmacy.    Medications   Medication List with Changes/Refills   Current Medications    ASPIRIN (ECOTRIN) 81 MG EC TABLET    Take 1 tablet (81 mg total) by mouth once daily.    ATORVASTATIN (LIPITOR) 80 MG TABLET    Take 1 tablet (80 mg total) by mouth once daily.    BACLOFEN (LIORESAL) 10 MG TABLET    Take 1 tablet (10 mg total) by mouth 3 (three) times daily.    CETIRIZINE (ZYRTEC) 10 MG TABLET    Take 1 tablet (10 mg total) by mouth once daily.    CLOPIDOGREL (PLAVIX) 75 MG TABLET    Take 1 tablet (75 mg total) by mouth once daily.    FAMOTIDINE (PEPCID) 20 MG TABLET    Take 1 tablet (20 mg total) by mouth 2 (two) times daily.    FLUTICASONE PROPIONATE (FLONASE) 50 MCG/ACTUATION NASAL SPRAY    1 spray (50 mcg total) by Each Nostril route 2 (two) times daily as needed.    IBUPROFEN (ADVIL,MOTRIN) 600 MG TABLET    Take 1 tablet (600 mg total) by mouth every 6 (six) hours as needed for Pain.    MELOXICAM (MOBIC) 15 MG TABLET    Take 1 tablet (15 mg total) by mouth once daily.    METOPROLOL  "TARTRATE (LOPRESSOR) 50 MG TABLET    Take 1 tablet (50 mg total) by mouth 2 (two) times daily.    NITROGLYCERIN (NITROSTAT) 0.4 MG SL TABLET    Place 1 tablet (0.4 mg total) under the tongue every 5 (five) minutes as needed for Chest pain.    ONDANSETRON (ZOFRAN-ODT) 8 MG TBDL    Take 1 tablet (8 mg total) by mouth every 6 (six) hours as needed (n/v).       Allergies  Review of patient's allergies indicates:  No Known Allergies  Past Medical history  Past Medical History:   Diagnosis Date    Acute coronary syndrome 2017    ?spont RPDA coronary dissection, not PCI target, EF normal    Hyperlipidemia           Surgical History  Past Surgical History:   Procedure Laterality Date    CARDIAC CATHETERIZATION       SECTION      4 kids. All c-sections    HYSTERECTOMY  2017    OOPHORECTOMY  2017     Family History  Family History   Problem Relation Name Age of Onset    Hypertension Mother Huyen     Heart attack Father Dontrell      Social History  Social History[1]       Review of system     ROS  Negative except as mentioned above  Physical exam   Vital Signs  Vitals:    25 1029   BP: (!) 149/94   Pulse: 83   Resp: 18   Temp: 98.3 °F (36.8 °C)   TempSrc: Oral   SpO2: 98%   Weight: 132.4 kg (291 lb 14.2 oz)   Height: 5' 4" (1.626 m)       Physical Exam  Constitutional:       Appearance: Normal appearance. She is obese.   Cardiovascular:      Rate and Rhythm: Normal rate and regular rhythm.      Pulses: Normal pulses.      Heart sounds: Normal heart sounds.   Pulmonary:      Effort: Pulmonary effort is normal. No respiratory distress.      Breath sounds: Normal breath sounds. No wheezing.   Abdominal:      General: Bowel sounds are normal. There is no distension.      Palpations: Abdomen is soft.      Tenderness: There is no abdominal tenderness.   Musculoskeletal:      Right lower leg: No edema.      Left lower leg: No edema.   Skin:     General: Skin is warm.   Neurological:      Mental Status: " She is alert and oriented to person, place, and time.           Wt Readings from Last 3 Encounters:   04/01/25 132.4 kg (291 lb 14.2 oz)   03/18/25 133.3 kg (293 lb 14 oz)   10/29/24 133.4 kg (294 lb 1.5 oz)          Body mass index is 50.1 kg/m².      Laboratory data and other diagnostic findings     Lab Results   Component Value Date    WBC 5.75 03/19/2025    HGB 13.1 03/19/2025    HCT 44.0 03/19/2025    MCV 89 03/19/2025     03/19/2025         Lab Results   Component Value Date    CREATININE 0.8 03/19/2025    BUN 13 03/19/2025     03/19/2025    K 4.5 03/19/2025     03/19/2025    CO2 22 (L) 03/19/2025         Assessment and plan       Intermittent lightheadedness     Clinically improving.  Paperwork filled out for work leave.  Continue follow up with Cardiology.      Problem List Items Addressed This Visit    None  Visit Diagnoses         Intermittent lightheadedness    -  Primary                  Future Appointments  Future Appointments   Date Time Provider Department Center   4/11/2025  3:20 PM Bandar Dye MD [facility]   10/1/2025  4:20 PM Man Mccartney MD [facility]       Man Mccartney MD    This office note was created by combination of typed  and MModal dictation.  Transcription errors may be present.  If there are any questions, please contact me.       25  minutes of total time spent on the encounter, which includes face to face time and non-face to face time preparing to see the patient (eg, review of tests), Obtaining and/or reviewing separately obtained history, Documenting clinical information in the electronic or other health record, Independently interpreting results (not separately reported) and communicating results to the patient/family/caregiver, or Care coordination (not separately reported).           [1]   Social History  Tobacco Use    Smoking status: Never    Smokeless tobacco: Never   Substance Use Topics     Alcohol use: No    Drug use: No

## 2025-04-01 NOTE — TELEPHONE ENCOUNTER
----- Message from Charles sent at 4/1/2025  3:38 PM CDT -----  Type: Patient Call Back Who called: Self What is the request in detail: pt stated abdifatah RUIZ is requesting the copy of the paperwork from todays appt.  Can the clinic reply by [facility]? Would the patient rather a call back or a response via [facility]? Call back Best call back number: [phone] Additional Information:

## 2025-04-02 ENCOUNTER — TELEPHONE (OUTPATIENT)
Dept: FAMILY MEDICINE | Facility: CLINIC | Age: 55
End: 2025-04-02
Payer: COMMERCIAL

## 2025-04-02 NOTE — TELEPHONE ENCOUNTER
Copied from CRM #[id number]. Topic: General Inquiry - Information Request  >> Apr 2, 2025  9:24 AM Lynn wrote:  .Type: Patient Call Back    Who called: Self     What is the request in detail: asked for her medical leave paperwork to be sent to [facility] (fax) [phone]    Can the clinic reply by [facility]? No     Would the patient rather a call back or a response via [facility]? Call     Best call back number: .[phone]      Additional Information: please call

## 2025-04-03 ENCOUNTER — TELEPHONE (OUTPATIENT)
Dept: FAMILY MEDICINE | Facility: CLINIC | Age: 55
End: 2025-04-03
Payer: COMMERCIAL

## 2025-04-03 NOTE — TELEPHONE ENCOUNTER
Pt states we can email to [email or web address]; the phone number is [phone]            Copied from CRM #[id number]. Topic: General Inquiry - Return Call  >> Apr 3, 2025  2:40 PM Lynn wrote:  .Type:  Patient Returning Call    Who Called: Self     Who Left Message for Patient: AIMEE    Does the patient know what this is regarding?:yes     Would the patient rather a call back or a response via [facility]? Call     Best Call Back Number: .[phone]

## 2025-04-03 NOTE — TELEPHONE ENCOUNTER
Pt states her paperwork has not been received; advised her it has been faxed to the number provided multiple times and we received confirmation; advised her to see how else we can send them the paperwork since the fax is not working; states she will check with them and call back

## 2025-04-10 ENCOUNTER — TELEPHONE (OUTPATIENT)
Dept: FAMILY MEDICINE | Facility: CLINIC | Age: 55
End: 2025-04-10
Payer: COMMERCIAL

## 2025-04-10 NOTE — LETTER
April 10, 2025      [facility]  [address] [place] [address]  Phone: [phone]  Fax: [phone]       Patient: Rach Davalos   YOB: 1970  Date of Visit: 04/10/2025    To Whom It May Concern:    Lexy Davalos  was at [facility] on 04/10/2025. The patient may return to work/school on 04/14/2025 with no restrictions. If you have any questions or concerns, or if I can be of further assistance, please do not hesitate to contact me.    Sincerely,    Man Mccartney MD

## 2025-04-10 NOTE — TELEPHONE ENCOUNTER
----- Message from Charles sent at 4/10/2025 11:34 AM CDT -----  Type: Patient Call Back Who called: Self What is the request in detail: pt needs to a doctors note stating she's cleared and able to return to work with no restrictions and light duties on 04/14/2025.  Can the clinic reply by [facility]? Would the patient rather a call back or a response via [facility]? Call back  Best call back number: [phone] Additional Information:

## 2025-04-23 ENCOUNTER — OFFICE VISIT (OUTPATIENT)
Dept: CARDIOLOGY | Facility: CLINIC | Age: 55
End: 2025-04-23
Payer: COMMERCIAL

## 2025-04-23 VITALS
SYSTOLIC BLOOD PRESSURE: 130 MMHG | OXYGEN SATURATION: 97 % | HEIGHT: 64 IN | WEIGHT: 293 LBS | BODY MASS INDEX: 50.02 KG/M2 | RESPIRATION RATE: 18 BRPM | DIASTOLIC BLOOD PRESSURE: 62 MMHG | HEART RATE: 90 BPM

## 2025-04-23 DIAGNOSIS — R94.31 ABNORMAL EKG: ICD-10-CM

## 2025-04-23 DIAGNOSIS — E78.2 MIXED HYPERLIPIDEMIA: ICD-10-CM

## 2025-04-23 DIAGNOSIS — I10 ESSENTIAL HYPERTENSION: ICD-10-CM

## 2025-04-23 DIAGNOSIS — E66.01 MORBIDLY OBESE: ICD-10-CM

## 2025-04-23 DIAGNOSIS — R94.31 INFERIOR ST SEGMENT DEPRESSION: ICD-10-CM

## 2025-04-23 DIAGNOSIS — I25.42 SPONTANEOUS DISSECTION OF CORONARY ARTERY: Primary | ICD-10-CM

## 2025-04-23 DIAGNOSIS — I25.10 ASCVD (ARTERIOSCLEROTIC CARDIOVASCULAR DISEASE): ICD-10-CM

## 2025-04-23 PROCEDURE — 3075F SYST BP GE 130 - 139MM HG: CPT | Mod: CPTII,S$GLB,, | Performed by: INTERNAL MEDICINE

## 2025-04-23 PROCEDURE — G2211 COMPLEX E/M VISIT ADD ON: HCPCS | Mod: S$GLB,,, | Performed by: INTERNAL MEDICINE

## 2025-04-23 PROCEDURE — 99999 PR PBB SHADOW E&M-EST. PATIENT-LVL IV: CPT | Mod: PBBFAC,,, | Performed by: INTERNAL MEDICINE

## 2025-04-23 PROCEDURE — 3078F DIAST BP <80 MM HG: CPT | Mod: CPTII,S$GLB,, | Performed by: INTERNAL MEDICINE

## 2025-04-23 PROCEDURE — 1160F RVW MEDS BY RX/DR IN RCRD: CPT | Mod: CPTII,S$GLB,, | Performed by: INTERNAL MEDICINE

## 2025-04-23 PROCEDURE — 99204 OFFICE O/P NEW MOD 45 MIN: CPT | Mod: S$GLB,,, | Performed by: INTERNAL MEDICINE

## 2025-04-23 PROCEDURE — 93000 ELECTROCARDIOGRAM COMPLETE: CPT | Mod: S$GLB,,, | Performed by: INTERNAL MEDICINE

## 2025-04-23 PROCEDURE — 3008F BODY MASS INDEX DOCD: CPT | Mod: CPTII,S$GLB,, | Performed by: INTERNAL MEDICINE

## 2025-04-23 PROCEDURE — 1159F MED LIST DOCD IN RCRD: CPT | Mod: CPTII,S$GLB,, | Performed by: INTERNAL MEDICINE

## 2025-04-23 RX ORDER — NITROGLYCERIN 0.4 MG/1
0.4 TABLET SUBLINGUAL EVERY 5 MIN PRN
Qty: 50 TABLET | Refills: 3 | Status: SHIPPED | OUTPATIENT
Start: 2025-04-23 | End: 2026-04-23

## 2025-04-23 RX ORDER — ASPIRIN 81 MG/1
81 TABLET ORAL DAILY
COMMUNITY
Start: 2025-04-23 | End: 2026-04-23

## 2025-04-23 RX ORDER — METOPROLOL TARTRATE 50 MG/1
50 TABLET ORAL 2 TIMES DAILY
Qty: 180 TABLET | Refills: 3 | Status: SHIPPED | OUTPATIENT
Start: 2025-04-23

## 2025-04-23 RX ORDER — ATORVASTATIN CALCIUM 40 MG/1
40 TABLET, FILM COATED ORAL DAILY
Qty: 90 TABLET | Refills: 3 | Status: SHIPPED | OUTPATIENT
Start: 2025-04-23

## 2025-04-23 NOTE — PROGRESS NOTES
CARDIOVASCULAR CONSULTATION    REASON FOR CONSULT:   Rach Davalos is a 54 y.o. female who presents for f/u SCAD/NSTEMI.    PCP: Sherrill  HISTORY OF PRESENT ILLNESS:   Last seen .      The patient is a very pleasant 54-year-old woman who comes in today at the request of her primary care physician for ongoing management of a history of presumed spontaneous coronary artery dissection.  It has been over 6 years since her last visit.  She denies ongoing angina or dyspnea.  There has been no palpitations or syncope.  She denies PND, orthopnea, melena, hematuria, or claudication symptoms.  She has not been taking her statin.      Family history is negative for premature CAD amongst first-degree relatives.      The patient denies tobacco use, alcohol excess, or illicit drug use.    Her EKG performed in the office today notes significant inferolateral ST depression concerning for ischemia.    CARDIOVASCULAR HISTORY:   CAD 17 NSTEMI, cath with RPDA ?spont dissection, med rx planned, EF normal     PAST MEDICAL HISTORY:     Past Medical History:   Diagnosis Date    Acute coronary syndrome 2017    ?spont RPDA coronary dissection, not PCI target, EF normal    Hyperlipidemia        PAST SURGICAL HISTORY:     Past Surgical History:   Procedure Laterality Date    CARDIAC CATHETERIZATION       SECTION      4 kids. All c-sections    HYSTERECTOMY  2017    OOPHORECTOMY  2017       ALLERGIES AND MEDICATION:   Review of patient's allergies indicates:  No Known Allergies     Medication List            Accurate as of 2025  3:59 PM. If you have any questions, ask your nurse or doctor.                CHANGE how you take these medications      atorvastatin 40 MG tablet  Commonly known as: LIPITOR  Take 1 tablet (40 mg total) by mouth once daily.  What changed:   medication strength  how much to take  Changed by: Bandar Dye MD            CONTINUE taking these medications      aspirin 81  MG EC tablet  Commonly known as: ECOTRIN  Take 1 tablet (81 mg total) by mouth once daily.     metoprolol tartrate 50 MG tablet  Commonly known as: LOPRESSOR  Take 1 tablet (50 mg total) by mouth 2 (two) times daily.     nitroGLYCERIN 0.4 MG SL tablet  Commonly known as: NITROSTAT  Place 1 tablet (0.4 mg total) under the tongue every 5 (five) minutes as needed for Chest pain.            STOP taking these medications      baclofen 10 MG tablet  Commonly known as: LIORESAL  Stopped by: Bandar Dye MD     cetirizine 10 MG tablet  Commonly known as: ZYRTEC  Stopped by: Bandar Dye MD     clopidogreL 75 mg tablet  Commonly known as: PLAVIX  Stopped by: Bandar Dye MD     famotidine 20 MG tablet  Commonly known as: PEPCID  Stopped by: Bandar Dye MD     fluticasone propionate 50 mcg/actuation nasal spray  Commonly known as: FLONASE  Stopped by: Bandar Dye MD     ibuprofen 600 MG tablet  Commonly known as: ADVIL,MOTRIN  Stopped by: Bandar Dye MD     meloxicam 15 MG tablet  Commonly known as: MOBIC  Stopped by: Bandar Dye MD     ondansetron 8 MG Tbdl  Commonly known as: ZOFRAN-ODT  Stopped by: Bandar Dye MD               Where to Get Your Medications        These medications were sent to [id number] - [place] [address] [facility] [place] [address] [address], [facility] [address]      Hours: 24-hours Phone: [phone]   atorvastatin 40 MG tablet  metoprolol tartrate 50 MG tablet  nitroGLYCERIN 0.4 MG SL tablet       You can get these medications from any pharmacy    You don't need a prescription for these medications  aspirin 81 MG EC tablet         SOCIAL HISTORY:   Social History[1]    FAMILY HISTORY:     Family History   Problem Relation Name Age of Onset    Hypertension Mother Huyen     Heart attack Father Dontrell        REVIEW OF SYSTEMS:   Review of Systems   Constitutional:  Negative for chills,  "diaphoresis and fever.   HENT:  Negative for nosebleeds.    Eyes:  Negative for blurred vision, double vision and photophobia.   Respiratory:  Negative for hemoptysis, shortness of breath and wheezing.    Cardiovascular:  Negative for chest pain, palpitations, orthopnea, claudication, leg swelling and PND.   Gastrointestinal:  Negative for abdominal pain, blood in stool, heartburn, melena, nausea and vomiting.   Genitourinary:  Negative for flank pain and hematuria.   Musculoskeletal:  Negative for falls, myalgias and neck pain.   Skin:  Negative for rash.   Neurological:  Negative for dizziness, seizures, loss of consciousness, weakness and headaches.   Endo/Heme/Allergies:  Negative for polydipsia. Does not bruise/bleed easily.   Psychiatric/Behavioral:  Negative for depression and memory loss. The patient is not nervous/anxious.        PHYSICAL EXAM:     Vitals:    04/23/25 1542   BP: 130/62   Pulse: 90   Resp: 18    Body mass index is 51.05 kg/m².  Weight: 134.9 kg (297 lb 6.4 oz)   Height: 5' 4" (162.6 cm)     Physical Exam  Vitals reviewed.   Constitutional:       General: She is not in acute distress.     Appearance: She is well-developed. She is obese. She is not ill-appearing, toxic-appearing or diaphoretic.   HENT:      Head: Normocephalic and atraumatic.   Eyes:      General: No scleral icterus.     Extraocular Movements: Extraocular movements intact.      Conjunctiva/sclera: Conjunctivae normal.      Pupils: Pupils are equal, round, and reactive to light.   Neck:      Thyroid: No thyromegaly.      Vascular: Normal carotid pulses. No carotid bruit or JVD.      Trachea: Trachea normal.   Cardiovascular:      Rate and Rhythm: Normal rate and regular rhythm.      Pulses:           Carotid pulses are 2+ on the right side and 2+ on the left side.     Heart sounds: S1 normal and S2 normal. Heart sounds are distant. No murmur heard.     No friction rub. No gallop.   Pulmonary:      Effort: Pulmonary effort is " "normal. No respiratory distress.      Breath sounds: Normal breath sounds. No stridor. No wheezing, rhonchi or rales.   Chest:      Chest wall: No tenderness.   Abdominal:      General: There is no distension.      Palpations: Abdomen is soft.   Musculoskeletal:         General: No swelling or tenderness. Normal range of motion.      Cervical back: Normal range of motion and neck supple. No edema or rigidity.      Right lower leg: No edema.      Left lower leg: No edema.   Feet:      Right foot:      Skin integrity: No ulcer.      Left foot:      Skin integrity: No ulcer.   Skin:     General: Skin is warm and dry.      Coloration: Skin is not jaundiced.   Neurological:      General: No focal deficit present.      Mental Status: She is alert and oriented to person, place, and time.      Cranial Nerves: No cranial nerve deficit.   Psychiatric:         Mood and Affect: Mood normal.         Speech: Speech normal.         Behavior: Behavior normal. Behavior is cooperative.         DATA:   EKG: (personally reviewed tracing(s))  4/23/25 SR 86, inflat ST depression (>1mm) c/w isch, new vs 8/30/24    Laboratory:  CBC:  Recent Labs   Lab 09/16/24  0745 03/19/25  0930   WBC 5.60 5.75   Hemoglobin 12.9 13.1   Hematocrit 42.6 44.0   Platelets 264 247       CHEMISTRIES:  Recent Labs   Lab 09/16/24  0745 03/19/25  0930   Glucose 112 H 103   Sodium 138 140   Potassium 4.5 4.5   BUN 14 13   Creatinine 0.9 0.8   eGFR >60.0 >60.0   Calcium 9.3 8.6 L   Magnesium  --  2.0       CARDIAC BIOMARKERS:        Invalid input(s): "TROPONINHS"    COAGS:  Recent Labs   Lab 08/30/24  0938   INR 1.3 H       LIPIDS/LFTS:  Recent Labs   Lab 09/16/24  0745 03/19/25  0930   Cholesterol 190  --    Triglycerides 90  --    HDL 53  --    LDL Cholesterol 119.0  --    Non-HDL Cholesterol 137  --    AST 14 18   ALT 12 14       Cardiovascular Testing:  Cath 11/30/17  LVEDP: 11mmHg  LVEF: 55% by echo  Wall Motion: normal by echo  Dominance: Right  LM: " normal  LAD: MLI  LCx: MLI  RCA: MLI              RPDA: ?dissection throughout, T2-3 flow, small caliber/tortuous, not PCI target.  Hemostasis:  R Radial band  Impression:  NSTEMI  1V CAD, normal LV fxn  RPDA culprit (?spontaneous dissection), not PCI target  Plan:  Med rx  Cont ASA/Plavix/BBl/Statin  Home in am  Follow up with Dr. Dye 1 week    Echo: 11/30/17    1 - Normal left ventricular systolic function (EF 55-60%).     2 - No wall motion abnormalities.     3 - Concentric hypertrophy.     4 - Mild mitral regurgitation.     5 - Trivial to mild tricuspid regurgitation.     6 - The estimated PA systolic pressure is 32 mmHg.     ASSESSMENT:   # CAD/NSTEMI/?spont RPDA dissection 11/2017, no ongoning sxs but significant EKG abnormalities noted (inflat ST depression).  # HTN, controlled  # HLP, not taking statin  # BMI 51, up 3 units vs last OV    PLAN:   Cont med rx  Cont ASA 81mg qd  Resume atorva 40mg qhs  Echo  Ex MPI (EKG with >1mm downsloping ST depression)  Diet/exercise/weight loss  RTC 1 month for review (May 2025)    The above documents medical care services that are part of ongoing care related to this patient's serious/complex condition (Code ). (SCAD/HTN/HLP)        Bandar Dye MD, FACC       [1]   Social History  Socioeconomic History    Marital status:    Tobacco Use    Smoking status: Never    Smokeless tobacco: Never   Substance and Sexual Activity    Alcohol use: No    Drug use: No    Sexual activity: Yes     Partners: Male     Birth control/protection: None     Social Drivers of Health     Financial Resource Strain: Low Risk  (3/18/2025)    Overall Financial Resource Strain (CARDIA)     Difficulty of Paying Living Expenses: Not very hard   Food Insecurity: No Food Insecurity (3/18/2025)    Hunger Vital Sign     Worried About Running Out of Food in the Last Year: Never true     Ran Out of Food in the Last Year: Never true   Transportation Needs: No Transportation Needs  (3/18/2025)    PRAPARE - Transportation     Lack of Transportation (Medical): No     Lack of Transportation (Non-Medical): No   Physical Activity: Inactive (3/18/2025)    Exercise Vital Sign     Days of Exercise per Week: 1 day     Minutes of Exercise per Session: 0 min   Stress: Patient Declined (3/18/2025)    [employer] - Occupational Stress Questionnaire     Feeling of Stress : Patient declined   Housing Stability: Low Risk  (3/18/2025)    Housing Stability Vital Sign     Unable to Pay for Housing in the Last Year: No     Number of Times Moved in the Last Year: 0     Homeless in the Last Year: No

## 2025-04-24 LAB
OHS QRS DURATION: 96 MS
OHS QTC CALCULATION: 445 MS